# Patient Record
Sex: FEMALE | Race: BLACK OR AFRICAN AMERICAN | Employment: FULL TIME | URBAN - METROPOLITAN AREA
[De-identification: names, ages, dates, MRNs, and addresses within clinical notes are randomized per-mention and may not be internally consistent; named-entity substitution may affect disease eponyms.]

---

## 2020-11-23 ENCOUNTER — OFFICE VISIT (OUTPATIENT)
Dept: INTERNAL MEDICINE CLINIC | Age: 35
End: 2020-11-23
Payer: COMMERCIAL

## 2020-11-23 VITALS
BODY MASS INDEX: 36.5 KG/M2 | SYSTOLIC BLOOD PRESSURE: 126 MMHG | HEIGHT: 63 IN | TEMPERATURE: 97.5 F | HEART RATE: 72 BPM | DIASTOLIC BLOOD PRESSURE: 84 MMHG | OXYGEN SATURATION: 99 % | WEIGHT: 206 LBS

## 2020-11-23 DIAGNOSIS — N92.0 MENORRHAGIA WITH REGULAR CYCLE: ICD-10-CM

## 2020-11-23 DIAGNOSIS — M67.432 GANGLION CYST OF DORSUM OF LEFT WRIST: ICD-10-CM

## 2020-11-23 DIAGNOSIS — Z00.01 ENCOUNTER FOR ROUTINE ADULT HEALTH EXAMINATION WITH ABNORMAL FINDINGS: ICD-10-CM

## 2020-11-23 DIAGNOSIS — F32.1 MODERATE MAJOR DEPRESSION (HCC): Primary | ICD-10-CM

## 2020-11-23 DIAGNOSIS — E66.01 SEVERE OBESITY (HCC): ICD-10-CM

## 2020-11-23 PROCEDURE — 99203 OFFICE O/P NEW LOW 30 MIN: CPT | Performed by: INTERNAL MEDICINE

## 2020-11-23 NOTE — PROGRESS NOTES
Chief Complaint   Patient presents with    New Patient    Menstrual Problem    Depression    Anxiety     Pt states that she needs to establish a new PCP. States that she is having problems with her menstrual cycle. She said that she gets blood clots that are about half dollar signs. She said that three weeks will pass and she will start to have a menstrual cycle with the same problem. Has problems with anxiety and depression. Has had no previous PCP or obgyn.

## 2020-11-23 NOTE — PROGRESS NOTES
Hortensia Burleson is a 37 y.o. female and presents with New Patient; Menstrual Problem; Depression; and Anxiety    She says in 2015 she had a miscarriage since then she started feeeling derpessed, she  a year ago and she moved here from [facility] since then. She says she feels tired all the time, with no desire to do anything, she says she has been having episodes of sudden uncontrolable crying, palpitations, shaking a couple of times a month without a specific trigger, she says this episodes have misty happenning in around 3 years or so, she has not spoken to anyone about how she's feeling, she feels hopeeless and woryhless 3 days out of the week, no suicidal thoughts. She says she has no one to talk to. She sleeps 6 hpurs every day, she works at night, at [employer] she enjoys it. Somedays she has no appetite and does not eat anything, some days she eats well. She says she has lost weight, she was 230 lbs a year ago. She's concerned about heavy menstruation, she says in the past year she has been having longer periods of around 6 days, the first 3 days she has clots, she has to change pads every 3 hours she says they get soaked. She says she has chronic anemia h/o since childhood, but she denies any fh of sickle cell and thalassemia, she says she has not been checked for these either. LMP 11/12/2020. Review of Systems  Review of Systems   Constitutional: Negative for chills, fatigue, fever and unexpected weight change. HENT: Negative for congestion, ear pain, sneezing and sore throat. Eyes: Negative for pain and discharge. Respiratory: Negative for cough, shortness of breath and wheezing. Cardiovascular: Negative for chest pain, palpitations and leg swelling. Gastrointestinal: Negative for abdominal pain, blood in stool, constipation and diarrhea. Endocrine: Negative for polydipsia and polyuria.    Genitourinary: Negative for difficulty urinating, dysuria, frequency, hematuria and urgency. Musculoskeletal: Negative for arthralgias, back pain and joint swelling. Skin: Negative for rash. Allergic/Immunologic: Negative for environmental allergies and food allergies. Neurological: Negative for dizziness, speech difficulty, weakness, light-headedness, numbness and headaches. Hematological: Negative for adenopathy. Psychiatric/Behavioral: Positive for behavioral problems (Depression). Negative for sleep disturbance and suicidal ideas. Past Medical History:   Diagnosis Date    Anemia     Anxiety     Depression      Past Surgical History:   Procedure Laterality Date    HX ORTHOPAEDIC Left 2007    knee     Social History     Socioeconomic History    Marital status: LEGALLY      Spouse name: Not on file    Number of children: Not on file    Years of education: Not on file    Highest education level: Not on file   Tobacco Use    Smoking status: Current Some Day Smoker     Years: 10.00    Smokeless tobacco: Never Used    Tobacco comment: smokes black and milds   Substance and Sexual Activity    Alcohol use: Yes     Comment: occassional    Drug use: Never    Sexual activity: Yes     Partners: Male     Birth control/protection: None     Family History   Problem Relation Age of Onset    Stroke Mother     Stroke Sister     Colon Cancer Maternal Grandmother        No Known Allergies    Objective:  Visit Vitals  /84 (BP 1 Location: Left arm, BP Patient Position: Sitting)   Pulse 72   Temp 97.5 °F (36.4 °C) (Oral)   Ht 5' 3\" (1.6 m)   Wt 206 lb (93.4 kg)   LMP 11/12/2011   SpO2 99% Comment: RA   BMI 36.49 kg/m²     Physical Exam:   Physical Exam  Constitutional:       General: She is not in acute distress. Appearance: Normal appearance. She is obese. HENT:      Head: Normocephalic and atraumatic. Mouth/Throat:      Mouth: Mucous membranes are moist.   Eyes:      Extraocular Movements: Extraocular movements intact.       Conjunctiva/sclera: Conjunctivae normal.      Pupils: Pupils are equal, round, and reactive to light. Neck:      Musculoskeletal: Normal range of motion and neck supple. Cardiovascular:      Rate and Rhythm: Normal rate and regular rhythm. Pulses: Normal pulses. Heart sounds: Normal heart sounds. Pulmonary:      Effort: Pulmonary effort is normal.      Breath sounds: Normal breath sounds. Abdominal:      General: Abdomen is flat. Bowel sounds are normal. There is no distension. Palpations: Abdomen is soft. There is no mass. Tenderness: There is no abdominal tenderness. Musculoskeletal:         General: No swelling or deformity. Right lower leg: No edema. Left lower leg: No edema. Lymphadenopathy:      Cervical: No cervical adenopathy. Skin:     General: Skin is warm and dry. Capillary Refill: Capillary refill takes less than 2 seconds. Coloration: Skin is not jaundiced or pale. Findings: No erythema or rash. Neurological:      General: No focal deficit present. Mental Status: She is alert and oriented to person, place, and time. Psychiatric:         Mood and Affect: Mood normal.         Behavior: Behavior normal.         Thought Content: Thought content normal.         Judgment: Judgment normal.          No results found for this or any previous visit. Assessment/Plan: For depression we discussed about potential treatments, we have decided she will try counseling first, gave her flyer for [place] [id number] for this purpose. Check TFTs, check routine labs. 1200 calorie diet given, recommended brisk walking 30 to 40 mins. goal to lose 5% of her weight in 3 months. For menorrhagia referring her to OB/GYN      ICD-10-CM ICD-9-CM    1. Moderate major depression (HCC)  F32.1 296.22 T4, FREE      TSH 3RD GENERATION   2. Severe obesity ([facility] [address]  E66.01 278.01    3.  Menorrhagia with regular cycle  N92.0 626.2 REFERRAL TO GYNECOLOGY   4. Encounter for routine adult health examination with abnormal findings  Z00.01 V70.0 CBC WITH AUTOMATED DIFF      METABOLIC PANEL, COMPREHENSIVE   5. Ganglion cyst of dorsum of left wrist  M67.432 727.41 REFERRAL TO ORTHOPEDICS     Orders Placed This Encounter    CBC WITH AUTOMATED DIFF    METABOLIC PANEL, COMPREHENSIVE    T4, FREE    TSH 3RD GENERATION    REFERRAL TO GYNECOLOGY     Referral Priority:   Routine     Referral Type:   Consultation     Referral Reason:   Specialty Services Required     Referred to Provider:   Roya Martinez MD     Number of Visits Requested:   1    REFERRAL TO ORTHOPEDICS     Referral Priority:   Routine     Referral Type:   Consultation     Referral Reason:   Specialty Services Required     Referred to Provider:   Roshni Carey MD     Number of Visits Requested:   1       There are no Patient Instructions on file for this visit. Follow-up and Dispositions    · Return in about 2 months (around 1/23/2021).

## 2020-11-26 LAB
ALBUMIN SERPL-MCNC: 3.8 G/DL (ref 3.8–4.8)
ALBUMIN/GLOB SERPL: 1.2 {RATIO} (ref 1.2–2.2)
ALP SERPL-CCNC: 74 IU/L (ref 39–117)
ALT SERPL-CCNC: 13 IU/L (ref 0–32)
AST SERPL-CCNC: 12 IU/L (ref 0–40)
BASOPHILS # BLD AUTO: 0 X10E3/UL (ref 0–0.2)
BASOPHILS NFR BLD AUTO: 0 %
BILIRUB SERPL-MCNC: <0.2 MG/DL (ref 0–1.2)
BUN SERPL-MCNC: 12 MG/DL (ref 6–20)
BUN/CREAT SERPL: 17 (ref 9–23)
CALCIUM SERPL-MCNC: 9.1 MG/DL (ref 8.7–10.2)
CHLORIDE SERPL-SCNC: 104 MMOL/L (ref 96–106)
CO2 SERPL-SCNC: 21 MMOL/L (ref 20–29)
CREAT SERPL-MCNC: 0.7 MG/DL (ref 0.57–1)
EOSINOPHIL # BLD AUTO: 0.2 X10E3/UL (ref 0–0.4)
EOSINOPHIL NFR BLD AUTO: 4 %
ERYTHROCYTE [DISTWIDTH] IN BLOOD BY AUTOMATED COUNT: 13.6 % (ref 11.7–15.4)
GLOBULIN SER CALC-MCNC: 3.2 G/DL (ref 1.5–4.5)
GLUCOSE SERPL-MCNC: 84 MG/DL (ref 65–99)
HCT VFR BLD AUTO: 35.3 % (ref 34–46.6)
HGB BLD-MCNC: 11.3 G/DL (ref 11.1–15.9)
IMM GRANULOCYTES # BLD AUTO: 0 X10E3/UL (ref 0–0.1)
IMM GRANULOCYTES NFR BLD AUTO: 0 %
LYMPHOCYTES # BLD AUTO: 4 X10E3/UL (ref 0.7–3.1)
LYMPHOCYTES NFR BLD AUTO: 68 %
MCH RBC QN AUTO: 26.8 PG (ref 26.6–33)
MCHC RBC AUTO-ENTMCNC: 32 G/DL (ref 31.5–35.7)
MCV RBC AUTO: 84 FL (ref 79–97)
MONOCYTES # BLD AUTO: 0.2 X10E3/UL (ref 0.1–0.9)
MONOCYTES NFR BLD AUTO: 4 %
NEUTROPHILS # BLD AUTO: 1.4 X10E3/UL (ref 1.4–7)
NEUTROPHILS NFR BLD AUTO: 24 %
PLATELET # BLD AUTO: 296 X10E3/UL (ref 150–450)
POTASSIUM SERPL-SCNC: 3.9 MMOL/L (ref 3.5–5.2)
PROT SERPL-MCNC: 7 G/DL (ref 6–8.5)
RBC # BLD AUTO: 4.21 X10E6/UL (ref 3.77–5.28)
SODIUM SERPL-SCNC: 139 MMOL/L (ref 134–144)
T4 FREE SERPL-MCNC: 1.12 NG/DL (ref 0.82–1.77)
TSH SERPL DL<=0.005 MIU/L-ACNC: 2.5 UIU/ML (ref 0.45–4.5)
WBC # BLD AUTO: 5.9 X10E3/UL (ref 3.4–10.8)

## 2021-10-28 ENCOUNTER — HOSPITAL ENCOUNTER (EMERGENCY)
Age: 36
Discharge: HOME | End: 2021-10-28
Attending: EMERGENCY MEDICINE
Payer: COMMERCIAL

## 2021-10-28 ENCOUNTER — APPOINTMENT (OUTPATIENT)
Dept: CT IMAGING | Age: 36
End: 2021-10-28
Attending: EMERGENCY MEDICINE
Payer: COMMERCIAL

## 2021-10-28 VITALS
SYSTOLIC BLOOD PRESSURE: 141 MMHG | RESPIRATION RATE: 16 BRPM | DIASTOLIC BLOOD PRESSURE: 87 MMHG | HEART RATE: 80 BPM | OXYGEN SATURATION: 95 % | TEMPERATURE: 99 F | BODY MASS INDEX: 37.17 KG/M2 | WEIGHT: 202 LBS | HEIGHT: 62 IN

## 2021-10-28 DIAGNOSIS — N20.0 KIDNEY STONE: Primary | ICD-10-CM

## 2021-10-28 LAB
APPEARANCE UR: ABNORMAL
BILIRUB UR QL: NEGATIVE
COLOR UR: YELLOW
GLUCOSE UR STRIP.AUTO-MCNC: NEGATIVE MG/DL
HCG UR QL: NEGATIVE
HGB UR QL STRIP: NEGATIVE
KETONES UR QL STRIP.AUTO: NEGATIVE MG/DL
LEUKOCYTE ESTERASE UR QL STRIP.AUTO: NEGATIVE
NITRITE UR QL STRIP.AUTO: NEGATIVE
PH UR STRIP: 5 [PH] (ref 5–8)
PROT UR STRIP-MCNC: NEGATIVE MG/DL
SP GR UR REFRACTOMETRY: 1.02 (ref 1–1.03)
UROBILINOGEN UR QL STRIP.AUTO: 0.1 EU/DL (ref 0.2–1)

## 2021-10-28 PROCEDURE — 81025 URINE PREGNANCY TEST: CPT

## 2021-10-28 PROCEDURE — 74176 CT ABD & PELVIS W/O CONTRAST: CPT

## 2021-10-28 PROCEDURE — 99283 EMERGENCY DEPT VISIT LOW MDM: CPT

## 2021-10-28 PROCEDURE — 74011250636 HC RX REV CODE- 250/636: Performed by: EMERGENCY MEDICINE

## 2021-10-28 PROCEDURE — 96372 THER/PROPH/DIAG INJ SC/IM: CPT

## 2021-10-28 PROCEDURE — 81003 URINALYSIS AUTO W/O SCOPE: CPT

## 2021-10-28 PROCEDURE — 74011250637 HC RX REV CODE- 250/637: Performed by: EMERGENCY MEDICINE

## 2021-10-28 RX ORDER — ACETAMINOPHEN 500 MG
1000 TABLET ORAL ONCE
Status: COMPLETED | OUTPATIENT
Start: 2021-10-28 | End: 2021-10-28

## 2021-10-28 RX ORDER — ONDANSETRON 4 MG/1
4 TABLET, ORALLY DISINTEGRATING ORAL
Qty: 12 TABLET | Refills: 0 | Status: SHIPPED | OUTPATIENT
Start: 2021-10-28

## 2021-10-28 RX ORDER — IBUPROFEN 800 MG/1
800 TABLET ORAL
Qty: 20 TABLET | Refills: 0 | Status: SHIPPED | OUTPATIENT
Start: 2021-10-28 | End: 2021-11-04

## 2021-10-28 RX ORDER — TAMSULOSIN HYDROCHLORIDE 0.4 MG/1
0.4 CAPSULE ORAL DAILY
Qty: 3 CAPSULE | Refills: 0 | Status: SHIPPED | OUTPATIENT
Start: 2021-10-28 | End: 2021-10-31

## 2021-10-28 RX ORDER — HYDROCODONE BITARTRATE AND ACETAMINOPHEN 5; 325 MG/1; MG/1
1 TABLET ORAL
Qty: 12 TABLET | Refills: 0 | Status: SHIPPED | OUTPATIENT
Start: 2021-10-28 | End: 2021-10-31

## 2021-10-28 RX ORDER — KETOROLAC TROMETHAMINE 30 MG/ML
60 INJECTION, SOLUTION INTRAMUSCULAR; INTRAVENOUS
Status: COMPLETED | OUTPATIENT
Start: 2021-10-28 | End: 2021-10-28

## 2021-10-28 RX ORDER — TAMSULOSIN HYDROCHLORIDE 0.4 MG/1
0.4 CAPSULE ORAL ONCE
Status: COMPLETED | OUTPATIENT
Start: 2021-10-28 | End: 2021-10-28

## 2021-10-28 RX ADMIN — TAMSULOSIN HYDROCHLORIDE 0.4 MG: 0.4 CAPSULE ORAL at 21:32

## 2021-10-28 RX ADMIN — KETOROLAC TROMETHAMINE 60 MG: 30 INJECTION, SOLUTION INTRAMUSCULAR; INTRAVENOUS at 19:40

## 2021-10-28 RX ADMIN — ACETAMINOPHEN 1000 MG: 500 TABLET ORAL at 19:40

## 2021-10-28 NOTE — ED TRIAGE NOTES
Started 10-, At work sharp right lateral lower rib pain, then went to vagina, , tested at Dr. No bladder issue given Nitrofurnatoin Mono and Toradol. Didn't help. Today became sharp just to right lower lateral rib cage, non tender, turn to right makes worse, except sharp pain on deep breath, Now has urinary frequency.

## 2021-10-28 NOTE — LETTER
Westley Khan was seen and treated in our emergency department on 10/28/2021. Please excuse from work 10/28/2021 through 10/30/2021. If you have any questions or concerns, please don't hesitate to call.       Attending Provider: Carlee Angel MD

## 2021-10-29 NOTE — ED PROVIDER NOTES
EMERGENCY DEPARTMENT HISTORY AND PHYSICAL EXAM      Date: 10/28/2021  Patient Name: Lizy Avina    History of Presenting Illness     Chief Complaint   Patient presents with    Rib Pain       History Provided By: Patient    HPI: Lizy Avina, 37 y.o. female   presents to the ED with cc of right flank pain. Patient complains of right flank pain for last 2 weeks. Pain has been constant with fluctuating intensity from mild to moderate degree without obvious aggravating alleviating factors. Patient was seen at local emergency room for possible UTI without much relief. No dysuria hematuria. No nausea vomiting or diarrhea. No signs of GI bleeding. No injury. Patient states that pain intermittently radiates to right lower quadrant. No paresthesia or motor dysfunction. No fever chills. PCP: More Willoughby MD    No current facility-administered medications on file prior to encounter. No current outpatient medications on file prior to encounter. Past History     Past Medical History:  Past Medical History:   Diagnosis Date    Anemia     Anxiety     Depression        Past Surgical History:  Past Surgical History:   Procedure Laterality Date    HX ORTHOPAEDIC Left 2007    knee       Family History:  Family History   Problem Relation Age of Onset    Stroke Mother     Stroke Sister     Colon Cancer Maternal Grandmother        Social History:  Social History     Tobacco Use    Smoking status: Former Smoker     Years: 10.00    Smokeless tobacco: Never Used    Tobacco comment: smokes black and milds   Vaping Use    Vaping Use: Some days    Substances: Flavoring    Devices: Disposable   Substance Use Topics    Alcohol use: Yes     Comment: occassional    Drug use: Never       Allergies:  No Known Allergies      Review of Systems   Review of Systems   Constitutional: Negative for activity change, appetite change, chills and fever. HENT: Negative for sore throat. Eyes: Negative for discharge. Respiratory: Negative for shortness of breath. Cardiovascular: Negative for chest pain. Gastrointestinal: Negative for nausea. Endocrine: Negative for polyuria. Genitourinary: Negative for difficulty urinating. Musculoskeletal: Negative for arthralgias. Skin: Negative for rash. Neurological: Negative for headaches. Hematological: Negative for adenopathy. Psychiatric/Behavioral: Negative for suicidal ideas. All other systems reviewed and are negative. Physical Exam   Physical Exam  Vitals and nursing note reviewed. Constitutional:       Appearance: Normal appearance. HENT:      Head: Normocephalic and atraumatic. Nose: Nose normal.      Mouth/Throat:      Mouth: Mucous membranes are moist.      Pharynx: Oropharynx is clear. Eyes:      Conjunctiva/sclera: Conjunctivae normal.   Cardiovascular:      Rate and Rhythm: Normal rate and regular rhythm. Heart sounds: Normal heart sounds. Pulmonary:      Effort: Pulmonary effort is normal.      Breath sounds: Normal breath sounds. Abdominal:      General: Abdomen is flat. Bowel sounds are normal.      Palpations: Abdomen is soft. Tenderness: There is no abdominal tenderness. There is no right CVA tenderness, left CVA tenderness, guarding or rebound. Musculoskeletal:      Cervical back: Neck supple. Right lower leg: No edema. Left lower leg: No edema. Skin:     General: Skin is warm and dry. Neurological:      General: No focal deficit present. Mental Status: She is alert and oriented to person, place, and time.    Psychiatric:         Mood and Affect: Mood normal.         Diagnostic Study Results     Labs -     Recent Results (from the past 12 hour(s))   URINALYSIS W/ RFLX MICROSCOPIC    Collection Time: 10/28/21  7:15 PM   Result Value Ref Range    Color Yellow      Appearance Cloudy (A) Clear      Specific gravity 1.020 1.003 - 1.030      pH (UA) 5.0 5.0 - 8.0 Protein Negative Negative mg/dL    Glucose Negative Negative mg/dL    Ketone Negative Negative mg/dL    Bilirubin Negative Negative      Blood Negative Negative      Urobilinogen 0.1 (L) 0.2 - 1.0 EU/dL    Nitrites Negative Negative      Leukocyte Esterase Negative Negative     HCG URINE, QL    Collection Time: 10/28/21  7:15 PM   Result Value Ref Range    HCG urine, QL Negative Negative         Radiologic Studies -   CT ABD PELV WO CONT   Final Result   2 mm distal right ureteral calculus producing no definite   obstruction. Dose reduction: All CT scans at this facility are performed using radiation dose   reduction optimization techniques as appropriate to a performed exam, including   the following: Automated exposure control (AEC), adjustment of the MAA and/or   KUB according to the patient's size, or the patient's use of iterative   reconstruction techniques (ASIR). CT Results  (Last 48 hours)               10/28/21 2000  CT ABD PELV WO CONT Final result    Impression:  2 mm distal right ureteral calculus producing no definite   obstruction. Dose reduction: All CT scans at this facility are performed using radiation dose   reduction optimization techniques as appropriate to a performed exam, including   the following: Automated exposure control (AEC), adjustment of the MAA and/or   KUB according to the patient's size, or the patient's use of iterative   reconstruction techniques (ASIR). Narrative:  CT scan the abdomen and pelvis is performed without IV or oral contrast per   renal colic protocol. Coronal reconstructions are generated. Comparison is made   with a similar exam from 12/28/2013. Findings:Lung bases are normal. The solid abdominal organs are normal within the   limitations of a non-IV contrast exam. Neither kidney reveals any calculi or   hydronephrosis. Both ureters are normal in caliber with no definite   ureterolithiasis.  A 2 mm calcification seen in the right posterior pelvis. This   corresponds with a tiny distal ureteral calculus medial to the right obturator   internus muscle. This corresponds with the opacified ureter on the delayed   images from the 12/20/2013 CT scan       No free intraperitoneal fluid or gas is identified. The bowel is unopacified but   normal in caliber. The appendix is not identified. The uterus is unremarkable. Bone windows are unremarkable. CXR Results  (Last 48 hours)    None            Medical Decision Making   I am the first provider for this patient. I reviewed the vital signs, available nursing notes, past medical history, past surgical history, family history and social history. Vital Signs-Reviewed the patient's vital signs. Patient Vitals for the past 12 hrs:   Temp Pulse Resp BP SpO2   10/28/21 1640 99 °F (37.2 °C) 80 16 (!) 141/87 95 %       Records Reviewed:     Provider Notes (Medical Decision Making):       ED Course:   Initial assessment performed. The patients presenting problems have been discussed, and they are in agreement with the care plan formulated and outlined with them. I have encouraged them to ask questions as they arise throughout their visit. PROCEDURES      Disposition: Condition stable   DC- Adult Discharges: All of the diagnostic tests were reviewed and questions answered. Diagnosis, care plan and treatment options were discussed. understand instructions and will follow up as directed. The patients results have been reviewed with them. They have been counseled regarding their diagnosis. The patient verbally convey understanding and agreement of the signs, symptoms, diagnosis, treatment and prognosis and additionally agrees to follow up as recommended. They also agree with the care-plan and convey that all of their questions have been answered.   I have also put together some discharge instructions for them that include: 1) educational information regarding their diagnosis, 2) how to care for their diagnosis at home, as well a 3) list of reasons why they would want to return to the ED prior to their follow-up appointment, should their condition change. PLAN:  1. Discharge Medication List as of 10/28/2021  9:16 PM      START taking these medications    Details   ibuprofen (MOTRIN) 800 mg tablet Take 1 Tablet by mouth every eight (8) hours as needed for Pain for up to 7 days. , Normal, Disp-20 Tablet, R-0      HYDROcodone-acetaminophen (Norco) 5-325 mg per tablet Take 1 Tablet by mouth every six (6) hours as needed for Pain for up to 3 days. Max Daily Amount: 4 Tablets., Normal, Disp-12 Tablet, R-0      ondansetron (Zofran ODT) 4 mg disintegrating tablet Take 1 Tablet by mouth every eight (8) hours as needed for Nausea, Vomiting or Nausea or Vomiting., Normal, Disp-12 Tablet, R-0      tamsulosin (Flomax) 0.4 mg capsule Take 1 Capsule by mouth daily for 3 days. , Normal, Disp-3 Capsule, R-0           2. Follow-up Information     Follow up With Specialties Details Why Contact Info    Angelia Gee MD Urology   [address] [place] [id number]  [phone]          Return to ED if worse     Diagnosis     Clinical Impression:   1. Kidney stone        Please note that this dictation was completed with [employer], the computer voice recognition software. Quite often unanticipated grammatical, syntax, homophones, and other interpretive errors are inadvertently transcribed by the computer software. Please disregard these errors. Please excuse any errors that have escaped final proofreading. Thank you.

## 2022-01-07 ENCOUNTER — HOSPITAL ENCOUNTER (EMERGENCY)
Age: 37
Discharge: HOME | End: 2022-01-07
Attending: EMERGENCY MEDICINE
Payer: COMMERCIAL

## 2022-01-07 VITALS
BODY MASS INDEX: 34.96 KG/M2 | SYSTOLIC BLOOD PRESSURE: 146 MMHG | HEART RATE: 86 BPM | WEIGHT: 190 LBS | TEMPERATURE: 98.2 F | RESPIRATION RATE: 16 BRPM | DIASTOLIC BLOOD PRESSURE: 94 MMHG | OXYGEN SATURATION: 99 % | HEIGHT: 62 IN

## 2022-01-07 DIAGNOSIS — U07.1 COVID: Primary | ICD-10-CM

## 2022-01-07 PROCEDURE — 99281 EMR DPT VST MAYX REQ PHY/QHP: CPT

## 2022-01-07 RX ORDER — NAPROXEN 500 MG/1
500 TABLET ORAL
Qty: 20 TABLET | Refills: 0 | Status: SHIPPED | OUTPATIENT
Start: 2022-01-07

## 2022-01-07 NOTE — ED PROVIDER NOTES
EMERGENCY DEPARTMENT HISTORY AND PHYSICAL EXAM      Date: (Not on file)  Patient Name: Sherrie Sharma    History of Presenting Illness     Chief Complaint   Patient presents with    Positive For Covid-19       History Provided By: Patient    HPI: Sherrie Sharma, 31 y.o. female presents to the ED with CC of fever, chills, cough and runny nose and headaches. She denies any chest pain or shortness of breath. She is been diagnosed with Covid and needs a note for her employer. She has had a positive rapid test..       Patient denies SOB, chest pain, or any neurological symptoms. There are no other complaints, changes, or physical findings at this time. Past History     Past Medical History:  Past Medical History:   Diagnosis Date    Anemia     Anxiety     Depression        Allergies:  No Known Allergies    Review of Systems   Vital signs and nursing notes reviewed  Review of Systems   Constitutional: Positive for fatigue and fever. Negative for appetite change and chills. HENT: Positive for rhinorrhea. Negative for congestion and sinus pain. Eyes: Negative. Negative for pain and visual disturbance. Respiratory: Negative. Negative for chest tightness and shortness of breath. Cardiovascular: Negative. Negative for chest pain. Gastrointestinal: Negative. Negative for abdominal pain, diarrhea, nausea and vomiting. Genitourinary: Negative. Negative for difficulty urinating. No discharge   Musculoskeletal: Positive for myalgias. Negative for arthralgias. Skin: Negative. Negative for rash. Neurological: Negative. Negative for weakness and headaches. Hematological: Negative. Psychiatric/Behavioral: Negative. Negative for agitation. The patient is not nervous/anxious. All other systems reviewed and are negative.         Physical Exam     Visit Vitals  BP (!) 146/94 (BP 1 Location: Right arm, BP Patient Position: At rest;Sitting)   Pulse 86   Temp 98.2 °F (36.8 °C)   Resp 16   Ht 5' 2\" (1.575 m)   Wt 86.2 kg (190 lb)   SpO2 99%   BMI 34.75 kg/m²     CONSTITUTIONAL: Alert, in no distress. Appears stated age. HEAD:  Normocephalic, atraumatic  EYES: EOM intact. No conjunctival injection or scleral icterus  Neck:  Supple. No meningismus  RESP: Normal with no work of breathing, speaking in full sentences. CV: Well perfused. NEURO: Alert with normal mentation, moving extremities spontaneously  PSYCH: Normal mood, normal affect  *    Medical Decision Making   Patient presents for COVID 19 testing with normal oxygen saturation and mild URI symptoms or COVID 19 exposure. COVID 19 testing was not conducted. The patient was given quarantine/isolation recommendations and agrees with the plan to be discharged home. They were provided instructions to return for difficulty breathing, chest pain, altered mentation, or any other new or worsening symptoms. ED Course:   Initial assessment performed. The patients presenting problems have been discussed, and they are in agreement with the care plan formulated and outlined with them. I have encouraged them to ask questions as they arise throughout their visit. Critical Care Time: None    Disposition:  DISCHARGE NOTE:  The pt is ready for discharge. The pt's signs, symptoms, diagnosis, and discharge instructions have been discussed and pt has conveyed their understanding. The pt is to follow up as recommended or return to ER should their symptoms worsen. Plan has been discussed and pt is in agreement. PLAN:  1. Current Discharge Medication List      START taking these medications    Details   naproxen (NAPROSYN) 500 mg tablet Take 1 Tablet by mouth every twelve (12) hours as needed for Pain. Qty: 20 Tablet, Refills: 0  Start date: 1/7/2022           2.    Follow-up Information     Follow up With Specialties Details Why Contact Info    Griselda Motta MD Obstetrics & Gynecology Call in 1 week  [address] [place]  [phone]          3. COVID Testing results will be called once available if positive. Patient should utilize [employer] to access results. 4. Take Tylenol or Ibuprofen as needed  5. Drink plenty of fluids  6. Return to ED if worse especially if any shortness of breath, chest pain or altered mentation. Diagnosis     Clinical Impression:   1. COVID            Please note that this dictation was completed with [employer], the computer voice recognition software. Quite often unanticipated grammatical, syntax, homophones, and other interpretive errors are inadvertently transcribed by the computer software. Please disregards these errors. Please excuse any errors that have escaped final proofreading.

## 2022-01-07 NOTE — Clinical Note
[facility] EMERGENCY DEPT  [address] [facility] [place] [id number]  [phone]    Work/School Note    Date: 1/7/2022     To Whom It May concern:    Miguel Pedroza was evaluated by the following provider(s):  Attending Provider: Sae Rolle virus is suspected. Per the CDC guidelines we recommend home isolation until the following conditions are all met:    1. At least five days have passed since symptoms first appeared and/or had a close exposure,   2. After home isolation for five days, wearing a mask around others for the next five days,  3. At least 24 have passed since last fever without the use of fever-reducing medications and  4.  Symptoms (eg cough, shortness of breath) have improved      Sincerely,          Josephine Turner MD

## 2022-03-19 PROBLEM — E66.01 SEVERE OBESITY (HCC): Status: ACTIVE | Noted: 2020-11-23

## 2023-01-28 ENCOUNTER — APPOINTMENT (OUTPATIENT)
Dept: ULTRASOUND IMAGING | Age: 38
End: 2023-01-28
Attending: EMERGENCY MEDICINE
Payer: COMMERCIAL

## 2023-01-28 ENCOUNTER — HOSPITAL ENCOUNTER (EMERGENCY)
Age: 38
Discharge: HOME | End: 2023-01-28
Attending: EMERGENCY MEDICINE
Payer: COMMERCIAL

## 2023-01-28 VITALS
DIASTOLIC BLOOD PRESSURE: 75 MMHG | TEMPERATURE: 98.7 F | HEIGHT: 62 IN | WEIGHT: 199 LBS | RESPIRATION RATE: 18 BRPM | SYSTOLIC BLOOD PRESSURE: 118 MMHG | OXYGEN SATURATION: 100 % | BODY MASS INDEX: 36.62 KG/M2 | HEART RATE: 81 BPM

## 2023-01-28 DIAGNOSIS — O46.90 VAGINAL BLEEDING DURING PREGNANCY: ICD-10-CM

## 2023-01-28 DIAGNOSIS — D21.9 FIBROIDS: Primary | ICD-10-CM

## 2023-01-28 LAB
ABO + RH BLD: NORMAL
APPEARANCE UR: CLEAR
BACTERIA URNS QL MICRO: ABNORMAL /HPF
BILIRUB UR QL: NEGATIVE
COLOR UR: YELLOW
EPITH CASTS URNS QL MICRO: ABNORMAL /LPF
GLUCOSE UR STRIP.AUTO-MCNC: NEGATIVE MG/DL
HCG SERPL-ACNC: 422.1 MIU/ML (ref 0–6)
HGB UR QL STRIP: ABNORMAL
KETONES UR QL STRIP.AUTO: NEGATIVE MG/DL
LEUKOCYTE ESTERASE UR QL STRIP.AUTO: ABNORMAL
NITRITE UR QL STRIP.AUTO: NEGATIVE
PH UR STRIP: 5 (ref 5–8)
PROT UR STRIP-MCNC: 30 MG/DL
RBC #/AREA URNS HPF: ABNORMAL /HPF (ref 0–5)
SP GR UR REFRACTOMETRY: 1.03 (ref 1–1.03)
UA: UC IF INDICATED,UAUC: ABNORMAL
UROBILINOGEN UR QL STRIP.AUTO: 0.1 EU/DL (ref 0.2–1)
WBC URNS QL MICRO: ABNORMAL /HPF (ref 0–4)

## 2023-01-28 PROCEDURE — 99284 EMERGENCY DEPT VISIT MOD MDM: CPT

## 2023-01-28 PROCEDURE — 76801 OB US < 14 WKS SINGLE FETUS: CPT

## 2023-01-28 PROCEDURE — 84702 CHORIONIC GONADOTROPIN TEST: CPT

## 2023-01-28 PROCEDURE — 76817 TRANSVAGINAL US OBSTETRIC: CPT

## 2023-01-28 PROCEDURE — 86900 BLOOD TYPING SEROLOGIC ABO: CPT

## 2023-01-28 PROCEDURE — 81001 URINALYSIS AUTO W/SCOPE: CPT

## 2023-01-28 NOTE — ED TRIAGE NOTES
31 YO presents with positive pregnancy test. States she has been menstruating since 1/17, with right sided ab pain.  No dysuria, nausea vomiting

## 2023-01-28 NOTE — DISCHARGE INSTRUCTIONS
Thank you! Thank you for allowing me to care for you in the emergency department. It is my goal to provide you with excellent care. If you have not received excellent quality care, please ask to speak to the nurse manager. Please fill out the survey that will come to you by mail or email since we listen to your feedback! Below you will find a list of your tests from today's visit. Should you have any questions, please do not hesitate to call the emergency department. Labs  Recent Results (from the past 12 hour(s))   URINALYSIS W/ REFLEX CULTURE    Collection Time: 01/28/23  4:30 PM    Specimen: Urine   Result Value Ref Range    Color Yellow      Appearance Clear Clear      Specific gravity 1.030 1.003 - 1.030      pH (UA) 5.0 5.0 - 8.0      Protein 30 (A) Negative mg/dL    Glucose Negative Negative mg/dL    Ketone Negative Negative mg/dL    Bilirubin Negative Negative      Blood Large (A) Negative      Urobilinogen 0.1 (L) 0.2 - 1.0 EU/dL    Nitrites Negative Negative      Leukocyte Esterase Trace (A) Negative      WBC 5-10 0 - 4 /hpf    RBC  0 - 5 /hpf    Epithelial cells Moderate (A) Few /lpf    Bacteria 2+ (A) Negative /hpf    UA:UC IF INDICATED Culture not indicated by UA result Culture not indicated by UA result     BETA HCG, QT    Collection Time: 01/28/23  4:30 PM   Result Value Ref Range    Beta HCG, .1 (H) 0 - 6 mIU/mL       Radiologic Studies  US UTS TRANSVAGINAL OB    (Results Pending)     CT Results  (Last 48 hours)      None          CXR Results  (Last 48 hours)      None          ------------------------------------------------------------------------------------------------------------  The exam and treatment you received in the Emergency Department were for an urgent problem and are not intended as complete care.  It is important that you follow-up with a doctor, nurse practitioner, or physician assistant to:  (1) confirm your diagnosis,  (2) re-evaluation of changes in your illness and treatment, and  (3) for ongoing care. Please take your discharge instructions with you when you go to your follow-up appointment. If you have any problem arranging a follow-up appointment, contact the Emergency Department. If your symptoms become worse or you do not improve as expected and you are unable to reach your health care provider, please return to the Emergency Department. We are available 24 hours a day. If a prescription has been provided, please have it filled as soon as possible to prevent a delay in treatment. If you have any questions or reservations about taking the medication due to side effects or interactions with other medications, please call your primary care provider or contact the ER.

## 2023-01-28 NOTE — ED PROVIDER NOTES
[facility] EMERGENCY DEPARTMENT  EMERGENCY DEPARTMENT HISTORY AND PHYSICAL EXAM      Date: 2023  Patient Name: Mag Lion  MRN: [id number]  [place]: 1985  Date of evaluation: 2023  Provider: Madelaine Sandra MD   Note Started: 4:16 PM 23    HISTORY OF PRESENT ILLNESS     Chief Complaint   Patient presents with    Pregnancy Problem     vag    Vaginal Bleeding       History Provided By: Patient    HPI: Mag Lion, 30 y.o. female  (miscarriage 7 yrs ago) presents with vaginal bleeding since  which was during her regular menses but lasted much longer than usual. Pt states for past few months she has had irregular cycles, lasting longer than usual. She had a diff feeling yesterday in her lower abd and took pregnancy test two days ago which was positive. Her bleeding has slowed down to just spotting with wiping now. She states last day she bleed more like regular period was two days ago. She denies abd pain, dysuria, NV, breast tenderness.      PAST MEDICAL HISTORY   Past Medical History:  Past Medical History:   Diagnosis Date    Anemia     Anxiety     Depression     Fallopian tube disorder     Fibroid     Miscarriage        Past Surgical History:  Past Surgical History:   Procedure Laterality Date    HX ORTHOPAEDIC Left 2007    knee       Family History:  Family History   Problem Relation Age of Onset    Stroke Mother     Stroke Sister     Colon Cancer Maternal Grandmother        Social History:  Social History     Tobacco Use    Smoking status: Former     Years: 10.00     Types: Cigarettes    Smokeless tobacco: Never    Tobacco comments:     smokes black and milds   Vaping Use    Vaping Use: Former    Substances: Flavoring    Devices: Disposable   Substance Use Topics    Alcohol use: Not Currently     Comment: occasional    Drug use: Never       Allergies:  No Known Allergies    PCP: Anju Horton MD    Current Meds:   Previous Medications    NAPROXEN (NAPROSYN) 500 MG TABLET    Take 1 Tablet by mouth every twelve (12) hours as needed for Pain. ONDANSETRON (ZOFRAN ODT) 4 MG DISINTEGRATING TABLET    Take 1 Tablet by mouth every eight (8) hours as needed for Nausea, Vomiting or Nausea or Vomiting. REVIEW OF SYSTEMS   Review of Systems   Constitutional: Negative. Negative for activity change, appetite change, fatigue and fever. HENT: Negative. Negative for congestion, rhinorrhea and sore throat. Respiratory: Negative. Negative for cough, shortness of breath and wheezing. Cardiovascular: Negative. Negative for chest pain and leg swelling. Gastrointestinal: Negative. Negative for abdominal distention, abdominal pain, constipation, diarrhea, nausea and vomiting. Endocrine: Negative. Genitourinary:  Positive for menstrual problem and vaginal bleeding. Negative for difficulty urinating, dysuria, flank pain, urgency and vaginal discharge. Musculoskeletal: Negative. Negative for arthralgias, back pain, joint swelling and myalgias. Skin: Negative. Negative for rash. Neurological: Negative. Negative for dizziness, weakness, light-headedness and headaches. Psychiatric/Behavioral: Negative. Positives and Pertinent negatives as per HPI. PHYSICAL EXAM     ED Triage Vitals [01/28/23 1559]   ED Encounter Vitals Group      /69      Pulse (Heart Rate) 77      Resp Rate 18      Temp 98.7 °F (37.1 °C)      Temp src       O2 Sat (%) 100 %      Weight 199 lb      Height 5' 2\"      Physical Exam  Vitals and nursing note reviewed. Constitutional:       General: She is not in acute distress. Appearance: Normal appearance. She is not ill-appearing or toxic-appearing. HENT:      Head: Normocephalic. Mouth/Throat:      Mouth: Mucous membranes are moist.   Eyes:      Extraocular Movements: Extraocular movements intact. Conjunctiva/sclera: Conjunctivae normal.      Pupils: Pupils are equal, round, and reactive to light.    Cardiovascular: Rate and Rhythm: Normal rate and regular rhythm. Pulses: Normal pulses. Pulmonary:      Effort: Pulmonary effort is normal.      Breath sounds: Normal breath sounds. Abdominal:      General: Abdomen is flat. Bowel sounds are normal.      Palpations: Abdomen is soft. Tenderness: There is no abdominal tenderness. There is no guarding. Musculoskeletal:         General: No swelling. Normal range of motion. Cervical back: Normal range of motion. Skin:     Findings: No erythema or rash. Neurological:      General: No focal deficit present. Mental Status: She is alert and oriented to person, place, and time. Cranial Nerves: No cranial nerve deficit.    Psychiatric:         Mood and Affect: Mood normal.       SCREENINGS               No data recorded        LAB, EKG AND DIAGNOSTIC RESULTS   Labs:  Recent Results (from the past 12 hour(s))   URINALYSIS W/ REFLEX CULTURE    Collection Time: 01/28/23  4:30 PM    Specimen: Urine   Result Value Ref Range    Color Yellow      Appearance Clear Clear      Specific gravity 1.030 1.003 - 1.030      pH (UA) 5.0 5.0 - 8.0      Protein 30 (A) Negative mg/dL    Glucose Negative Negative mg/dL    Ketone Negative Negative mg/dL    Bilirubin Negative Negative      Blood Large (A) Negative      Urobilinogen 0.1 (L) 0.2 - 1.0 EU/dL    Nitrites Negative Negative      Leukocyte Esterase Trace (A) Negative      WBC 5-10 0 - 4 /hpf    RBC  0 - 5 /hpf    Epithelial cells Moderate (A) Few /lpf    Bacteria 2+ (A) Negative /hpf    UA:UC IF INDICATED Culture not indicated by UA result Culture not indicated by UA result     BETA HCG, QT    Collection Time: 01/28/23  4:30 PM   Result Value Ref Range    Beta HCG, .1 (H) 0 - 6 mIU/mL           Radiologic Studies:  Non-plain film images such as CT, Ultrasound and MRI are read by the radiologist. Plain radiographic images are visualized and preliminarily interpreted by the ED Provider with the below findings:        Interpretation per the Radiologist below, if available at the time of this note:  US UTS TRANSVAGINAL OB    Result Date: 1/28/2023  LIMITED TRANSABDOMINAL AND TRANSVAGINAL PELVIC ULTRASOUND WITH PELVIC DOPPLER. 1/28/2023 5:50 PM INDICATION: Vaginal bleeding, pregnant. COMPARISON: None. TECHNIQUE: Limited grayscale, color, and Doppler ultrasound imaging of the pelvis was performed both transabdominally and transvaginally. FINDINGS: No intrauterine pregnancy shown. The endometrium is obscured by fundal fibroids. A pedunculated fibroid in the left adnexa measures 40 mm. The ovaries are not shown. Intramuscular fundal fibroid. Pedunculated left fibroid. Fibroids obscure endometrium; no intrauterine pregnancy shown. US PREG UTS < 14 WKS SNGL    Result Date: 1/28/2023  LIMITED TRANSABDOMINAL AND TRANSVAGINAL PELVIC ULTRASOUND WITH PELVIC DOPPLER. 1/28/2023 5:50 PM INDICATION: Vaginal bleeding, pregnant. COMPARISON: None. TECHNIQUE: Limited grayscale, color, and Doppler ultrasound imaging of the pelvis was performed both transabdominally and transvaginally. FINDINGS: No intrauterine pregnancy shown. The endometrium is obscured by fundal fibroids. A pedunculated fibroid in the left adnexa measures 40 mm. The ovaries are not shown. Intramuscular fundal fibroid. Pedunculated left fibroid. Fibroids obscure endometrium; no intrauterine pregnancy shown. PROCEDURES   Unless otherwise noted below, none.   Performed by: Nitza Love MD   Procedures      CRITICAL CARE TIME       ED COURSE and DIFFERENTIAL DIAGNOSIS/MDM   Vitals:    Vitals:    01/28/23 1559   BP: 110/69   Pulse: 77   Resp: 18   Temp: 98.7 °F (37.1 °C)   SpO2: 100%   Weight: 90.3 kg (199 lb)   Height: 5' 2\" (1.575 m)        Patient was given the following medications:  Medications - No data to display    CONSULTS: (Who and What was discussed)  None     Chronic Conditions:   Social Determinants affecting Dx or Tx: None  Counseling: Records Reviewed (source and summary of external notes): Nursing notes    CC/HPI Summary, DDx, ED Course, and Reassessment: Having dysfunctional uterine bleeding, possible fibroids, pregnancy, bleeding possible miscarriage. We will check beta quant and order transvaginal ultrasound. Is difficult to assess timing of pregnancy. Patient has had prior miscarriage 7 years before. She denies any passage of tissue. She denies any dizziness lightheadedness or shortness of breath to suggest acute blood loss anemia. Progress note: Pt denies any vaginal bleeding during ED visit and has no pain at time of discharge. Disposition Considerations (Tests not done, Shared Decision Making, Pt Expectation of Test or Treatment.): Pt advised to obtain repeat beta HCG in 48 hours to determine if pregnancy evolving as expected. FINAL IMPRESSION     1. Fibroids    2. Vaginal bleeding during pregnancy          DISPOSITION/PLAN   Discharged    Discharge Note: The patient is stable for discharge home. The signs, symptoms, diagnosis, and discharge instructions have been discussed, understanding conveyed, and agreed upon. The patient is to follow up as recommended or return to ER should their symptoms worsen. PATIENT REFERRED TO:  Follow-up Information       Follow up With Specialties Details Why Contact Info        FOLLOW-UP WITH YOUR OB FOR REPEAT BETA HCG IN TWO DAYS OR RETURN TO ED        MAKE AN APPOINTMENT WITH OB    [address] [place] [address] Emergency Medicine  As needed, If symptoms worsen [address] [facility] [place] [id number] [phone]              DISCHARGE MEDICATIONS:  Current Discharge Medication List        START taking these medications    Details   prenatal vit no.130-iron-folic 27 mg iron- 608 mcg tab tablet Take 1 Tablet by mouth daily.   Qty: 30 Tablet, Refills: 1  Start date: 1/28/2023               DISCONTINUED MEDICATIONS:  Current Discharge Medication List          I am the Primary Clinician of Record: Rosetta Mancini MD (electronically signed)    (Please note that parts of this dictation were completed with voice recognition software. Quite often unanticipated grammatical, syntax, homophones, and other interpretive errors are inadvertently transcribed by the computer software. Please disregards these errors.  Please excuse any errors that have escaped final proofreading.)

## 2023-01-29 ENCOUNTER — APPOINTMENT (OUTPATIENT)
Dept: ULTRASOUND IMAGING | Age: 38
End: 2023-01-29
Attending: STUDENT IN AN ORGANIZED HEALTH CARE EDUCATION/TRAINING PROGRAM
Payer: COMMERCIAL

## 2023-01-29 ENCOUNTER — HOSPITAL ENCOUNTER (EMERGENCY)
Age: 38
Discharge: HOME | End: 2023-01-29
Attending: STUDENT IN AN ORGANIZED HEALTH CARE EDUCATION/TRAINING PROGRAM
Payer: COMMERCIAL

## 2023-01-29 VITALS
RESPIRATION RATE: 18 BRPM | HEIGHT: 62 IN | WEIGHT: 200 LBS | BODY MASS INDEX: 36.8 KG/M2 | HEART RATE: 80 BPM | TEMPERATURE: 97.9 F | SYSTOLIC BLOOD PRESSURE: 133 MMHG | OXYGEN SATURATION: 100 % | DIASTOLIC BLOOD PRESSURE: 90 MMHG

## 2023-01-29 DIAGNOSIS — D21.9 FIBROID: ICD-10-CM

## 2023-01-29 DIAGNOSIS — O46.90 VAGINAL BLEEDING IN PREGNANCY: Primary | ICD-10-CM

## 2023-01-29 LAB
ALBUMIN SERPL-MCNC: 3.6 G/DL (ref 3.5–5)
ALBUMIN/GLOB SERPL: 0.8 (ref 1.1–2.2)
ALP SERPL-CCNC: 77 U/L (ref 45–117)
ALT SERPL-CCNC: 25 U/L (ref 12–78)
ANION GAP SERPL CALC-SCNC: 3 MMOL/L (ref 5–15)
AST SERPL W P-5'-P-CCNC: 21 U/L (ref 15–37)
BILIRUB SERPL-MCNC: 0.2 MG/DL (ref 0.2–1)
BUN SERPL-MCNC: 9 MG/DL (ref 6–20)
BUN/CREAT SERPL: 13 (ref 12–20)
CA-I BLD-MCNC: 9.4 MG/DL (ref 8.5–10.1)
CHLORIDE SERPL-SCNC: 108 MMOL/L (ref 97–108)
CO2 SERPL-SCNC: 26 MMOL/L (ref 21–32)
CREAT SERPL-MCNC: 0.68 MG/DL (ref 0.55–1.02)
GLOBULIN SER CALC-MCNC: 4.4 G/DL (ref 2–4)
GLUCOSE SERPL-MCNC: 86 MG/DL (ref 65–100)
HCG SERPL QL: POSITIVE
HCG SERPL-ACNC: 469 MIU/ML (ref 0–6)
POTASSIUM SERPL-SCNC: 4.5 MMOL/L (ref 3.5–5.1)
PROT SERPL-MCNC: 8 G/DL (ref 6.4–8.2)
SODIUM SERPL-SCNC: 137 MMOL/L (ref 136–145)

## 2023-01-29 PROCEDURE — 84703 CHORIONIC GONADOTROPIN ASSAY: CPT

## 2023-01-29 PROCEDURE — 80053 COMPREHEN METABOLIC PANEL: CPT

## 2023-01-29 PROCEDURE — 96372 THER/PROPH/DIAG INJ SC/IM: CPT

## 2023-01-29 PROCEDURE — 36415 COLL VENOUS BLD VENIPUNCTURE: CPT

## 2023-01-29 PROCEDURE — 76801 OB US < 14 WKS SINGLE FETUS: CPT

## 2023-01-29 PROCEDURE — 76817 TRANSVAGINAL US OBSTETRIC: CPT

## 2023-01-29 PROCEDURE — 99284 EMERGENCY DEPT VISIT MOD MDM: CPT

## 2023-01-29 PROCEDURE — 74011250636 HC RX REV CODE- 250/636: Performed by: STUDENT IN AN ORGANIZED HEALTH CARE EDUCATION/TRAINING PROGRAM

## 2023-01-29 PROCEDURE — 84702 CHORIONIC GONADOTROPIN TEST: CPT

## 2023-01-29 RX ORDER — CEPHALEXIN 500 MG/1
500 CAPSULE ORAL 4 TIMES DAILY
Qty: 20 CAPSULE | Refills: 0 | Status: SHIPPED | OUTPATIENT
Start: 2023-01-29 | End: 2023-02-03

## 2023-01-29 RX ADMIN — HUMAN RHO(D) IMMUNE GLOBULIN 0.3 MG: 300 INJECTION, SOLUTION INTRAMUSCULAR at 15:10

## 2023-01-29 NOTE — Clinical Note
[facility] EMERGENCY DEPT  [address] [place] [address] [place] [id number] [phone]    Work/School Note    Date: 1/29/2023    To Whom It May concern:      Tanesha Mary was seen and treated today in the emergency room by the following provider(s):  Attending Provider: Jaclyn Molina MD.      Tanesha Mary is excused from work/school on 01/29/23. She is clear to return to work/school on 01/30/23.         Sincerely,          Clive Basurto LPN

## 2023-01-29 NOTE — ED PROVIDER NOTES
[facility] EMERGENCY DEPT  EMERGENCY DEPARTMENT HISTORY AND PHYSICAL EXAM      Date: 2023  Patient Name: Mike Le  MRN: [id number]  [place] 1985  Date of evaluation: 2023  Provider: Alina Finn MD   Note Started: 2:55 PM 23    HISTORY OF PRESENT ILLNESS     Chief Complaint   Patient presents with    Threatened Miscarriage       History Provided By: Patient    HPI: Mike Le, 30 y.o. female  presents for reevaluation of vaginal bleeding. She reports vaginal bleeding intermittently since her last menstrual cycle on . She took a pregnancy test 3 days ago and reports that it was positive. Bleeding increased yesterday and she was evaluated at [facility] where transvaginal ultrasound showed no intrauterine pregnancy, but did identify fibroids. hCG at that point was 455. She spoke with her OB today who recommended reevaluation in the emergency department with repeat ultrasound.     PAST MEDICAL HISTORY   Past Medical History:  Past Medical History:   Diagnosis Date    Anemia     Anxiety     Depression     Fallopian tube disorder     Fibroid     Miscarriage        Past Surgical History:  Past Surgical History:   Procedure Laterality Date    HX ORTHOPAEDIC Left 2007    knee       Family History:  Family History   Problem Relation Age of Onset    Stroke Mother     Stroke Sister     Colon Cancer Maternal Grandmother        Social History:  Social History     Tobacco Use    Smoking status: Former     Years: 10.00     Types: Cigarettes    Smokeless tobacco: Never    Tobacco comments:     smokes black and milds   Vaping Use    Vaping Use: Former    Substances: Flavoring    Devices: Disposable   Substance Use Topics    Alcohol use: Not Currently     Comment: occasional    Drug use: Never       Allergies:  No Known Allergies    PCP: Paula Santillan MD    Current Meds:   Previous Medications    NAPROXEN (NAPROSYN) 500 MG TABLET    Take 1 Tablet by mouth every twelve (12) hours as needed for Pain. ONDANSETRON (ZOFRAN ODT) 4 MG DISINTEGRATING TABLET    Take 1 Tablet by mouth every eight (8) hours as needed for Nausea, Vomiting or Nausea or Vomiting. PRENATAL VIT NO.130-IRON-FOLIC 27 MG IRON- 133 MCG TAB TABLET    Take 1 Tablet by mouth daily. REVIEW OF SYSTEMS   Review of Systems   Constitutional:  Negative for fever. HENT:  Negative for congestion. Respiratory:  Negative for cough and shortness of breath. Cardiovascular:  Negative for chest pain. Gastrointestinal:  Negative for abdominal pain, constipation, nausea and vomiting. Genitourinary:  Positive for vaginal bleeding. Negative for dysuria. Musculoskeletal:  Negative for arthralgias and myalgias. Skin:  Negative for rash. Allergic/Immunologic: Negative for immunocompromised state. Neurological:  Negative for syncope. Psychiatric/Behavioral:  Negative for confusion. Positives and Pertinent negatives as per HPI. PHYSICAL EXAM     ED Triage Vitals [01/29/23 1349]   ED Encounter Vitals Group      BP (!) 133/90      Pulse (Heart Rate) 80      Resp Rate 18      Temp 97.9 °F (36.6 °C)      Temp src       O2 Sat (%) 100 %      Weight 200 lb      Height 5' 2\"     Physical Exam  Constitutional:       Appearance: Normal appearance. HENT:      Head: Normocephalic and atraumatic. Nose: Nose normal.      Mouth/Throat:      Mouth: Mucous membranes are moist.   Eyes:      Conjunctiva/sclera: Conjunctivae normal.      Pupils: Pupils are equal, round, and reactive to light. Cardiovascular:      Rate and Rhythm: Normal rate and regular rhythm. Heart sounds: Normal heart sounds. Pulmonary:      Effort: Pulmonary effort is normal.      Breath sounds: Normal breath sounds. Abdominal:      General: There is no distension. Palpations: Abdomen is soft. Tenderness: There is no abdominal tenderness. Musculoskeletal:         General: No tenderness or deformity. Normal range of motion. Cervical back: Normal range of motion and neck supple. Skin:     General: Skin is warm and dry. Neurological:      General: No focal deficit present. Mental Status: She is alert and oriented to person, place, and time. Psychiatric:         Mood and Affect: Mood normal.         Behavior: Behavior normal.          SCREENINGS               No data recorded        LAB, EKG AND DIAGNOSTIC RESULTS   Labs:  Recent Results (from the past 12 hour(s))   BETA HCG, QT    Collection Time: 01/29/23  2:08 PM   Result Value Ref Range    Beta HCG, .0 (H) 0 - 6 mIU/mL   HCG QL SERUM    Collection Time: 01/29/23  2:08 PM   Result Value Ref Range    HCG, Ql. Positive (A) Negative     METABOLIC PANEL, COMPREHENSIVE    Collection Time: 01/29/23  2:08 PM   Result Value Ref Range    Sodium 137 136 - 145 mmol/L    Potassium 4.5 3.5 - 5.1 mmol/L    Chloride 108 97 - 108 mmol/L    CO2 26 21 - 32 mmol/L    Anion gap 3 (L) 5 - 15 mmol/L    Glucose 86 65 - 100 mg/dL    BUN 9 6 - 20 mg/dL    Creatinine 0.68 0.55 - 1.02 mg/dL    BUN/Creatinine ratio 13 12 - 20      eGFR >60 >60 ml/min/1.73m2    Calcium 9.4 8.5 - 10.1 mg/dL    Bilirubin, total 0.2 0.2 - 1.0 mg/dL    AST (SGOT) 21 15 - 37 U/L    ALT (SGPT) 25 12 - 78 U/L    Alk.  phosphatase 77 45 - 117 U/L    Protein, total 8.0 6.4 - 8.2 g/dL    Albumin 3.6 3.5 - 5.0 g/dL    Globulin 4.4 (H) 2.0 - 4.0 g/dL    A-G Ratio 0.8 (L) 1.1 - 2.2     RH IMMUNE GLOBULIN PROPHYLACTIC    Collection Time: 01/29/23  3:30 PM   Result Value Ref Range    No. of weeks gestation UNKNOWN     Unit number [id number]     Blood component type RH IMMUNE GLOBULIN     Unit division 00     Status of unit [address] to transfuse        Radiologic Studies:  Interpretation per the Radiologist below, if available at the time of this note:  US UTS TRANSVAGINAL OB    Result Date: 1/29/2023  INDICATION:  Threatened miscarriage COMPARISON:  Ultrasound yesterday FINDINGS:  Realtime sonographic imaging of the pelvis was performed transabdominally. The uterus measures 8.3 x 5.8 x 6.3 cm. Within the uterus, again demonstrated are fibroids. There is no evidence of intrauterine gestation. The right ovary measures 3.5 cm and is normal. The left ovary is not visualized by transabdominal technique due to bowel gas. To further evaluate the uterus, transvaginal sonography was necessary. The examination demonstrates no evidence of intrauterine gestation. Fibroids are redemonstrated, measuring up to 5.7 cm and 4.5 cm, respectively. The ovaries are normal, measuring 3.1 cm each, both with intact vascularity. There is no free fluid or adnexal mass. 1. No significant change. No evidence of intrauterine gestation. 2. Uterine fibroids, obscuring portions of the endometrium. 3. Normal ovaries and adnexa. Recommend correlation with dates and beta hCG, and consider serial beta hCG analysis. US UTS TRANSVAGINAL OB    Result Date: 1/28/2023  LIMITED TRANSABDOMINAL AND TRANSVAGINAL PELVIC ULTRASOUND WITH PELVIC DOPPLER. 1/28/2023 5:50 PM INDICATION: Vaginal bleeding, pregnant. COMPARISON: None. TECHNIQUE: Limited grayscale, color, and Doppler ultrasound imaging of the pelvis was performed both transabdominally and transvaginally. FINDINGS: No intrauterine pregnancy shown. The endometrium is obscured by fundal fibroids. A pedunculated fibroid in the left adnexa measures 40 mm. The ovaries are not shown. Intramuscular fundal fibroid. Pedunculated left fibroid. Fibroids obscure endometrium; no intrauterine pregnancy shown. US PREG UTS < 14 WKS SNGL    Result Date: 1/29/2023  INDICATION:  Threatened miscarriage COMPARISON:  Ultrasound yesterday FINDINGS:  Realtime sonographic imaging of the pelvis was performed transabdominally. The uterus measures 8.3 x 5.8 x 6.3 cm. Within the uterus, again demonstrated are fibroids. There is no evidence of intrauterine gestation.  The right ovary measures 3.5 cm and is normal. The left ovary is not visualized by transabdominal technique due to bowel gas. To further evaluate the uterus, transvaginal sonography was necessary. The examination demonstrates no evidence of intrauterine gestation. Fibroids are redemonstrated, measuring up to 5.7 cm and 4.5 cm, respectively. The ovaries are normal, measuring 3.1 cm each, both with intact vascularity. There is no free fluid or adnexal mass. 1. No significant change. No evidence of intrauterine gestation. 2. Uterine fibroids, obscuring portions of the endometrium. 3. Normal ovaries and adnexa. Recommend correlation with dates and beta hCG, and consider serial beta hCG analysis. US PREG UTS < 14 WKS SNGL    Result Date: 1/28/2023  LIMITED TRANSABDOMINAL AND TRANSVAGINAL PELVIC ULTRASOUND WITH PELVIC DOPPLER. 1/28/2023 5:50 PM INDICATION: Vaginal bleeding, pregnant. COMPARISON: None. TECHNIQUE: Limited grayscale, color, and Doppler ultrasound imaging of the pelvis was performed both transabdominally and transvaginally. FINDINGS: No intrauterine pregnancy shown. The endometrium is obscured by fundal fibroids. A pedunculated fibroid in the left adnexa measures 40 mm. The ovaries are not shown. Intramuscular fundal fibroid. Pedunculated left fibroid. Fibroids obscure endometrium; no intrauterine pregnancy shown.        PROCEDURES   Unless otherwise noted below, none  Performed by: Sandra Porter MD   Procedures        EMERGENCY DEPARTMENT COURSE and DIFFERENTIAL DIAGNOSIS/MDM   Vitals:    Vitals:    01/29/23 1349   BP: (!) 133/90   Pulse: 80   Resp: 18   Temp: 97.9 °F (36.6 °C)   SpO2: 100%   Weight: 90.7 kg (200 lb)   Height: 5' 2\" (1.575 m)        Patient was given the following medications:  Medications   rho D immune globulin (RHOGAM) 1,500 unit (300 mcg) injection 0.3 mg (0.3 mg IntraMUSCular Given 1/29/23 1510)       CC/HPI Summary, DDx, ED Course, and Reassessment:   72-year-old female presents for evaluation of vaginal bleeding. She was seen in the emergency department yesterday her transvaginal ultrasound showed no intrauterine pregnancy. hCG at that time was 455. She Alexx Marie presents today at the recommendation of her OB. We will repeat quantitative hCG and repeat transvaginal ultrasound. Suspect threatened or completed miscarriage  ED Course as of 01/29/23 1650   Sun Jan 29, 2023   1618 Consult Call  Dr. Zoltan Elias likely still too early to identify intrauterine pregnancy. Agrees with RhoGAM and prophylactic antibiotics for bacteriuria and recommends follow-up in OB clinic within the next 3 days [BQ]      ED Course User Index  [BQ] Baylee Sanchez MD           ED FINAL IMPRESSION     1. Vaginal bleeding in pregnancy    2. Fibroid          DISPOSITION/PLAN   Discharged    Discharge Note: The patient is stable for discharge home. The signs, symptoms, diagnosis, and discharge instructions have been discussed, understanding conveyed, and agreed upon. The patient is to follow up as recommended or return to ER should their symptoms worsen. PATIENT REFERRED TO:  Follow-up Information       Follow up With Specialties Details Why Contact Info    Alan Meehan MD Obstetrics & Gynecology, Gynecology, Obstetrics Schedule an appointment as soon as possible for a visit   [facility]  [phone]                DISCHARGE MEDICATIONS:  Current Discharge Medication List        START taking these medications    Details   cephALEXin (Keflex) 500 mg capsule Take 1 Capsule by mouth four (4) times daily for 5 days. Qty: 20 Capsule, Refills: 0  Start date: 1/29/2023, End date: 2/3/2023               DISCONTINUED MEDICATIONS:  Current Discharge Medication List          I am the Primary Clinician of Record. Fernando Cardoso MD (electronically signed)    (Please note that parts of this dictation were completed with voice recognition software.  Quite often unanticipated grammatical, syntax, homophones, and other interpretive errors are inadvertently transcribed by the computer software. Please disregards these errors.  Please excuse any errors that have escaped final proofreading.)

## 2023-01-29 NOTE — Clinical Note
[facility] EMERGENCY DEPT  [address] [place] [id number]  [phone]    Work/School Note    Date: 1/29/2023    To Whom It May concern:      Billy Huber was seen and treated today in the emergency room by the following provider(s):  Attending Provider: Catalino Bryson MD.      Billy Huber is excused from work/school on 01/29/23. She is clear to return to work/school on 01/30/23.         Sincerely,          Jayme Morales MD

## 2023-01-29 NOTE — ED TRIAGE NOTES
Pt went to [facility] yesterday when she found out she was pregnant because last year she she was diagnosed with uterine fibroids and fluid on her fallopian tubes. US was inconclusive, she spoke with [facility] regarding history and results who wanted her to be seen in ER.  LMP was 01/17/2023 with heavy bleeding, states her cycle is irregular but heavy bleeding is normal.

## 2023-01-29 NOTE — DISCHARGE INSTRUCTIONS
Thank you! Thank you for allowing me to care for you in the emergency department. It is my goal to provide you with excellent care. If you have not received excellent quality care, please ask to speak to the nurse manager. Please fill out the survey that will come to you by mail or email since we listen to your feedback! Below you will find a list of your tests from today's visit. Should you have any questions, please do not hesitate to call the emergency department. Labs  Recent Results (from the past 12 hour(s))   BETA HCG, QT    Collection Time: 01/29/23  2:08 PM   Result Value Ref Range    Beta HCG, .0 (H) 0 - 6 mIU/mL   HCG QL SERUM    Collection Time: 01/29/23  2:08 PM   Result Value Ref Range    HCG, Ql. Positive (A) Negative     METABOLIC PANEL, COMPREHENSIVE    Collection Time: 01/29/23  2:08 PM   Result Value Ref Range    Sodium 137 136 - 145 mmol/L    Potassium 4.5 3.5 - 5.1 mmol/L    Chloride 108 97 - 108 mmol/L    CO2 26 21 - 32 mmol/L    Anion gap 3 (L) 5 - 15 mmol/L    Glucose 86 65 - 100 mg/dL    BUN 9 6 - 20 mg/dL    Creatinine 0.68 0.55 - 1.02 mg/dL    BUN/Creatinine ratio 13 12 - 20      eGFR >60 >60 ml/min/1.73m2    Calcium 9.4 8.5 - 10.1 mg/dL    Bilirubin, total 0.2 0.2 - 1.0 mg/dL    AST (SGOT) 21 15 - 37 U/L    ALT (SGPT) 25 12 - 78 U/L    Alk. phosphatase 77 45 - 117 U/L    Protein, total 8.0 6.4 - 8.2 g/dL    Albumin 3.6 3.5 - 5.0 g/dL    Globulin 4.4 (H) 2.0 - 4.0 g/dL    A-G Ratio 0.8 (L) 1.1 - 2.2     RH IMMUNE GLOBULIN PROPHYLACTIC    Collection Time: 01/29/23  3:30 PM   Result Value Ref Range    No. of weeks gestation UNKNOWN     Unit number [id number]     Blood component type RH IMMUNE GLOBULIN     Unit division 00     Status of unit [address] to transfuse        Radiologic Studies  US PREG UTS < 14 WKS SNGL   Final Result      1. No significant change. No evidence of intrauterine gestation.    2. Uterine fibroids, obscuring portions of the endometrium. 3. Normal ovaries and adnexa. Recommend correlation with dates and beta hCG, and consider serial beta hCG   analysis. US UTS TRANSVAGINAL OB   Final Result      1. No significant change. No evidence of intrauterine gestation. 2. Uterine fibroids, obscuring portions of the endometrium. 3. Normal ovaries and adnexa. Recommend correlation with dates and beta hCG, and consider serial beta hCG   analysis. CT Results  (Last 48 hours)      None          CXR Results  (Last 48 hours)      None          ------------------------------------------------------------------------------------------------------------  The exam and treatment you received in the Emergency Department were for an urgent problem and are not intended as complete care. It is important that you follow-up with a doctor, nurse practitioner, or physician assistant to:  (1) confirm your diagnosis,  (2) re-evaluation of changes in your illness and treatment, and  (3) for ongoing care. Please take your discharge instructions with you when you go to your follow-up appointment. If you have any problem arranging a follow-up appointment, contact the Emergency Department. If your symptoms become worse or you do not improve as expected and you are unable to reach your health care provider, please return to the Emergency Department. We are available 24 hours a day. If a prescription has been provided, please have it filled as soon as possible to prevent a delay in treatment. If you have any questions or reservations about taking the medication due to side effects or interactions with other medications, please call your primary care provider or contact the ER.

## 2023-01-30 LAB
BLD PROD TYP BPU: NORMAL
BPU ID: NORMAL
GA (WEEKS): NORMAL WK
STATUS OF UNIT,%ST: NORMAL
TRANSFUSION STATUS PATIENT QL: NORMAL
UNIT DIVISION, %UDIV: 0

## 2023-01-31 ENCOUNTER — HOSPITAL ENCOUNTER (EMERGENCY)
Age: 38
Discharge: HOME | End: 2023-01-31
Attending: EMERGENCY MEDICINE
Payer: COMMERCIAL

## 2023-01-31 ENCOUNTER — APPOINTMENT (OUTPATIENT)
Dept: ULTRASOUND IMAGING | Age: 38
End: 2023-01-31
Attending: EMERGENCY MEDICINE
Payer: COMMERCIAL

## 2023-01-31 VITALS
OXYGEN SATURATION: 100 % | HEIGHT: 62 IN | BODY MASS INDEX: 36.8 KG/M2 | DIASTOLIC BLOOD PRESSURE: 93 MMHG | TEMPERATURE: 98.3 F | SYSTOLIC BLOOD PRESSURE: 132 MMHG | WEIGHT: 200 LBS | RESPIRATION RATE: 18 BRPM | HEART RATE: 81 BPM

## 2023-01-31 DIAGNOSIS — O46.90 VAGINAL BLEEDING IN PREGNANCY: Primary | ICD-10-CM

## 2023-01-31 LAB
ABO + RH BLD: NORMAL
ALBUMIN SERPL-MCNC: 3.6 G/DL (ref 3.5–5)
ALBUMIN/GLOB SERPL: 0.7 (ref 1.1–2.2)
ALP SERPL-CCNC: 80 U/L (ref 45–117)
ALT SERPL-CCNC: 27 U/L (ref 12–78)
ANION GAP SERPL CALC-SCNC: 4 MMOL/L (ref 5–15)
AST SERPL W P-5'-P-CCNC: 16 U/L (ref 15–37)
BASOPHILS # BLD: 0 K/UL (ref 0–0.1)
BASOPHILS NFR BLD: 0 % (ref 0–1)
BILIRUB SERPL-MCNC: 0.2 MG/DL (ref 0.2–1)
BLOOD GROUP ANTIBODIES SERPL: NORMAL
BLOOD GROUP ANTIBODIES SERPL: POSITIVE
BUN SERPL-MCNC: 9 MG/DL (ref 6–20)
BUN/CREAT SERPL: 13 (ref 12–20)
CA-I BLD-MCNC: 9.5 MG/DL (ref 8.5–10.1)
CHLORIDE SERPL-SCNC: 105 MMOL/L (ref 97–108)
CO2 SERPL-SCNC: 24 MMOL/L (ref 21–32)
CREAT SERPL-MCNC: 0.7 MG/DL (ref 0.55–1.02)
DAT POLY-SP REAG RBC QL: NEGATIVE
DIFFERENTIAL METHOD BLD: ABNORMAL
EOSINOPHIL # BLD: 0.1 K/UL (ref 0–0.4)
EOSINOPHIL NFR BLD: 2 % (ref 0–7)
ERYTHROCYTE [DISTWIDTH] IN BLOOD BY AUTOMATED COUNT: 15.5 % (ref 11.5–14.5)
GLOBULIN SER CALC-MCNC: 4.9 G/DL (ref 2–4)
GLUCOSE SERPL-MCNC: 82 MG/DL (ref 65–100)
HCG SERPL-ACNC: 456 MIU/ML (ref 0–6)
HCT VFR BLD AUTO: 37.9 % (ref 35–47)
HGB BLD-MCNC: 12.3 G/DL (ref 11.5–16)
IMM GRANULOCYTES # BLD AUTO: 0 K/UL (ref 0–0.04)
IMM GRANULOCYTES NFR BLD AUTO: 0 % (ref 0–0.5)
LYMPHOCYTES # BLD: 2.4 K/UL (ref 0.8–3.5)
LYMPHOCYTES NFR BLD: 44 % (ref 12–49)
MCH RBC QN AUTO: 26.6 PG (ref 26–34)
MCHC RBC AUTO-ENTMCNC: 32.5 G/DL (ref 30–36.5)
MCV RBC AUTO: 81.9 FL (ref 80–99)
MONOCYTES # BLD: 0.4 K/UL (ref 0–1)
MONOCYTES NFR BLD: 7 % (ref 5–13)
NEUTS SEG # BLD: 2.6 K/UL (ref 1.8–8)
NEUTS SEG NFR BLD: 47 % (ref 32–75)
NRBC # BLD: 0 K/UL (ref 0–0.01)
NRBC BLD-RTO: 0 PER 100 WBC
PLATELET # BLD AUTO: 282 K/UL (ref 150–400)
PMV BLD AUTO: 11.4 FL (ref 8.9–12.9)
POTASSIUM SERPL-SCNC: 3.6 MMOL/L (ref 3.5–5.1)
PROT SERPL-MCNC: 8.5 G/DL (ref 6.4–8.2)
RBC # BLD AUTO: 4.63 M/UL (ref 3.8–5.2)
SODIUM SERPL-SCNC: 133 MMOL/L (ref 136–145)
SPECIMEN EXP DATE BLD: NORMAL
WBC # BLD AUTO: 5.5 K/UL (ref 3.6–11)
XXAUTO CONTROL: POSITIVE

## 2023-01-31 PROCEDURE — 76817 TRANSVAGINAL US OBSTETRIC: CPT

## 2023-01-31 PROCEDURE — 86870 RBC ANTIBODY IDENTIFICATION: CPT

## 2023-01-31 PROCEDURE — 84702 CHORIONIC GONADOTROPIN TEST: CPT

## 2023-01-31 PROCEDURE — 85025 COMPLETE CBC W/AUTO DIFF WBC: CPT

## 2023-01-31 PROCEDURE — 86900 BLOOD TYPING SEROLOGIC ABO: CPT

## 2023-01-31 PROCEDURE — 99284 EMERGENCY DEPT VISIT MOD MDM: CPT

## 2023-01-31 PROCEDURE — 74011250637 HC RX REV CODE- 250/637: Performed by: EMERGENCY MEDICINE

## 2023-01-31 PROCEDURE — 80053 COMPREHEN METABOLIC PANEL: CPT

## 2023-01-31 PROCEDURE — 36415 COLL VENOUS BLD VENIPUNCTURE: CPT

## 2023-01-31 RX ORDER — HYDROCODONE BITARTRATE AND ACETAMINOPHEN 5; 325 MG/1; MG/1
1 TABLET ORAL
Qty: 12 TABLET | Refills: 0 | Status: SHIPPED | OUTPATIENT
Start: 2023-01-31 | End: 2023-02-03

## 2023-01-31 RX ORDER — MISOPROSTOL 200 UG/1
800 TABLET ORAL ONCE
Status: COMPLETED | OUTPATIENT
Start: 2023-01-31 | End: 2023-01-31

## 2023-01-31 RX ADMIN — MISOPROSTOL 800 MCG: 200 TABLET ORAL at 17:28

## 2023-01-31 NOTE — ED TRIAGE NOTES
Patient seen by urgent care advised she was having a miscarriage and to follow up with OB. Patient tried to make appointment with OB who advised her to go back to ER.

## 2023-01-31 NOTE — ED PROVIDER NOTES
EMERGENCY DEPARTMENT HISTORY AND PHYSICAL EXAM      Date: 1/31/2023  Patient Name: Terri Hughes    History of Presenting Illness     Chief Complaint   Patient presents with    Vaginal Bleeding       History Provided By: Patient    HPI: Terri Hughes, 30 y.o. female with a past medical history significant No significant past medical history presents to the ED with chief complaint of Vaginal Bleeding  . 29-year-old with active vaginal bleeding. Recent positive pregnancy test.  Has been having multiple lab tests ultrasound not showing any IUP concern for potential miscarriage. Patient has been following by Dr. Jeane Ro through [facility] AND [facility]. There are no other complaints, changes, or physical findings at this time. PCP: Catie Little MD    Current Outpatient Medications   Medication Sig Dispense Refill    cephALEXin (Keflex) 500 mg capsule Take 1 Capsule by mouth four (4) times daily for 5 days. 20 Capsule 0    prenatal vit no.130-iron-folic 27 mg iron- 129 mcg tab tablet Take 1 Tablet by mouth daily. 30 Tablet 1    naproxen (NAPROSYN) 500 mg tablet Take 1 Tablet by mouth every twelve (12) hours as needed for Pain. 20 Tablet 0    ondansetron (Zofran ODT) 4 mg disintegrating tablet Take 1 Tablet by mouth every eight (8) hours as needed for Nausea, Vomiting or Nausea or Vomiting.  12 Tablet 0       Past History     Past Medical History:  Past Medical History:   Diagnosis Date    Anemia     Anxiety     Depression     Fallopian tube disorder     Fibroid     Miscarriage        Past Surgical History:  Past Surgical History:   Procedure Laterality Date    HX ORTHOPAEDIC Left 2007    knee       Family History:  Family History   Problem Relation Age of Onset    Stroke Mother     Stroke Sister     Colon Cancer Maternal Grandmother        Social History:  Social History     Tobacco Use    Smoking status: Former     Years: 10.00     Types: Cigarettes    Smokeless tobacco: Never Tobacco comments:     smokes black and milds   Vaping Use    Vaping Use: Former    Substances: Flavoring    Devices: Disposable   Substance Use Topics    Alcohol use: Not Currently     Comment: occasional    Drug use: Never       Allergies:  No Known Allergies      Review of Systems   Review of Systems   Constitutional: Negative. Negative for chills, fatigue and fever. HENT: Negative. Negative for congestion, nosebleeds and sore throat. Eyes: Negative. Negative for pain, discharge and visual disturbance. Respiratory: Negative. Negative for cough, chest tightness and shortness of breath. Cardiovascular:  Negative for chest pain, palpitations and leg swelling. Gastrointestinal:  Negative for abdominal pain, blood in stool, constipation, diarrhea, nausea and vomiting. Endocrine: Negative. Genitourinary:  Positive for vaginal bleeding. Negative for difficulty urinating, dysuria and pelvic pain. Musculoskeletal: Negative. Negative for arthralgias, back pain and myalgias. Skin: Negative. Negative for rash and wound. Allergic/Immunologic: Negative. Neurological: Negative. Negative for dizziness, syncope, weakness, numbness and headaches. Hematological: Negative. Psychiatric/Behavioral: Negative. Negative for agitation, confusion and suicidal ideas. All other systems reviewed and are negative. Positives and Pertinent negatives as per HPI. Physical Exam   Patient Vitals for the past 24 hrs:   Temp Pulse Resp BP SpO2   01/31/23 1327 98.3 °F (36.8 °C) 81 18 (!) 132/93 100 %         Physical Exam  Vitals and nursing note reviewed. Exam conducted with a chaperone present. Constitutional:       Appearance: Normal appearance. She is normal weight. HENT:      Head: Normocephalic and atraumatic. Nose: Nose normal.      Mouth/Throat:      Mouth: Mucous membranes are moist.      Pharynx: Oropharynx is clear. Eyes:      Extraocular Movements: Extraocular movements intact. Conjunctiva/sclera: Conjunctivae normal.      Pupils: Pupils are equal, round, and reactive to light. Cardiovascular:      Rate and Rhythm: Normal rate and regular rhythm. Pulses: Normal pulses. Heart sounds: Normal heart sounds. Pulmonary:      Effort: Pulmonary effort is normal. No respiratory distress. Breath sounds: Normal breath sounds. Abdominal:      General: Abdomen is flat. Bowel sounds are normal. There is no distension. Palpations: Abdomen is soft. Tenderness: There is no abdominal tenderness. There is no guarding. Musculoskeletal:         General: No swelling, tenderness, deformity or signs of injury. Normal range of motion. Cervical back: Normal range of motion and neck supple. Right lower leg: No edema. Left lower leg: No edema. Skin:     General: Skin is warm and dry. Capillary Refill: Capillary refill takes less than 2 seconds. Findings: No lesion or rash. Neurological:      General: No focal deficit present. Mental Status: She is alert and oriented to person, place, and time. Mental status is at baseline. Cranial Nerves: No cranial nerve deficit. Psychiatric:         Mood and Affect: Mood normal.         Behavior: Behavior normal.         Thought Content:  Thought content normal.         Judgment: Judgment normal.       Diagnostic Study Results     LABS:   Recent Results (from the past 12 hour(s))   CBC WITH AUTOMATED DIFF    Collection Time: 01/31/23  2:19 PM   Result Value Ref Range    WBC 5.5 3.6 - 11.0 K/uL    RBC 4.63 3.80 - 5.20 M/uL    HGB 12.3 11.5 - 16.0 g/dL    HCT 37.9 35.0 - 47.0 %    MCV 81.9 80.0 - 99.0 FL    MCH 26.6 26.0 - 34.0 PG    MCHC 32.5 30.0 - 36.5 g/dL    RDW 15.5 (H) 11.5 - 14.5 %    PLATELET 175 482 - 101 K/uL    MPV 11.4 8.9 - 12.9 FL    NRBC 0.0 0.0  WBC    ABSOLUTE NRBC 0.00 0.00 - 0.01 K/uL    NEUTROPHILS 47 32 - 75 %    LYMPHOCYTES 44 12 - 49 %    MONOCYTES 7 5 - 13 %    EOSINOPHILS 2 0 - 7 %    BASOPHILS 0 0 - 1 %    IMMATURE GRANULOCYTES 0 0 - 0.5 %    ABS. NEUTROPHILS 2.6 1.8 - 8.0 K/UL    ABS. LYMPHOCYTES 2.4 0.8 - 3.5 K/UL    ABS. MONOCYTES 0.4 0.0 - 1.0 K/UL    ABS. EOSINOPHILS 0.1 0.0 - 0.4 K/UL    ABS. BASOPHILS 0.0 0.0 - 0.1 K/UL    ABS. IMM. GRANS. 0.0 0.00 - 0.04 K/UL    DF AUTOMATED     METABOLIC PANEL, COMPREHENSIVE    Collection Time: 01/31/23  2:19 PM   Result Value Ref Range    Sodium 133 (L) 136 - 145 mmol/L    Potassium 3.6 3.5 - 5.1 mmol/L    Chloride 105 97 - 108 mmol/L    CO2 24 21 - 32 mmol/L    Anion gap 4 (L) 5 - 15 mmol/L    Glucose 82 65 - 100 mg/dL    BUN 9 6 - 20 mg/dL    Creatinine 0.70 0.55 - 1.02 mg/dL    BUN/Creatinine ratio 13 12 - 20      eGFR >60 >60 ml/min/1.73m2    Calcium 9.5 8.5 - 10.1 mg/dL    Bilirubin, total 0.2 0.2 - 1.0 mg/dL    AST (SGOT) 16 15 - 37 U/L    ALT (SGPT) 27 12 - 78 U/L    Alk. phosphatase 80 45 - 117 U/L    Protein, total 8.5 (H) 6.4 - 8.2 g/dL    Albumin 3.6 3.5 - 5.0 g/dL    Globulin 4.9 (H) 2.0 - 4.0 g/dL    A-G Ratio 0.7 (L) 1.1 - 2.2     BETA HCG, QT    Collection Time: 01/31/23  2:19 PM   Result Value Ref Range    Beta HCG, .0 (H) 0 - 6 mIU/mL        EKG: EKG interpreted independently by ER physician. RADIOLOGY:  Non-plain film images such as CT, Ultrasound and MRI are read by the radiologist. Plain radiographic images are visualized and preliminarily interpreted by the ED Provider with the below findings:          Interpretation per the Radiologist below, if available at the time of this note:     No results found. CT Results  (Last 48 hours)      None          CXR Results  (Last 48 hours)      None            Medical Decision Making and ED Course       CC/HPI Summary, DDx, ED Course, and Reassessment: 79-year-old female with ongoing vaginal bleeding concern for miscarriage versus ectopic. Plan for lab work including beta hCG ultrasound. Patient is Rh- but just received RhoGAM day ago.     These orders were placed during the ER evaluation:  Orders Placed This Encounter    US UTS TRANSVAGINAL OB     Standing Status:   Standing     Number of Occurrences:   1     Order Specific Question:   Transport     Answer:   BED [2]     Order Specific Question:   Reason for Exam     Answer:   vb     Order Specific Question:   Is Patient Pregnant? Answer:   Yes    CBC WITH AUTOMATED DIFF     Standing Status:   Standing     Number of Occurrences:   1    COMPREHENSIVE METABOLIC PANEL     Standing Status:   Standing     Number of Occurrences:   1    HCG QT     Standing Status:   Standing     Number of Occurrences:   1    TYPE & SCREEN     ENTER SURGERY DATE IF FOR PRE-OP TESTING. Standing Status:   Standing     Number of Occurrences:   1     Order Specific Question:   Has patient been transfused or pregnant in the last 3 mos. ? Answer:   Unknown        Patient was given the following medications:  Medications - No data to display        ED Course:       ED Course as of 01/31/23 1518   Tue Jan 31, 2023   1516 Beta HCG, QT(!): 456.0 [HP]   1517 Hcg 469 on 1/29 [HP]      ED Course User Index  [HP] Nevaeh Bhardwaj MD         Vital Signs-Reviewed the patient's vital signs. Patient Vitals for the past 24 hrs:   Temp Pulse Resp BP SpO2   01/31/23 1327 98.3 °F (36.8 °C) 81 18 (!) 132/93 100 %     Vitals interpreted independently by ER physician. stable    Medical decision making tools:                No data recorded          Disposition Considerations (Tests not done, Shared Decision Making, Pt Expectation of Test or Tx.): Patient's vitals are stable. Awaiting beta-hCG for comparison. And repeat ultrasound for evaluation for potential ectopic. CONSULTS: (Who and What was discussed)  None    Chronic Conditions: no    Social Determinants affecting Dx or Tx: None    Records Reviewed (source and summary of external notes): None  Records Reviewed: Previous Hospital chart. EMS run report.   I reviewed the vital signs, available nursing notes, past medical history, past surgical history, family history and social history. Initial assessment performed. The patients presenting problems have been discussed, and they are in agreement with the care plan formulated and outlined with them. I have encouraged them to ask questions as they arise throughout their visit. Procedures               Disposition       Emergency Department Disposition:        Diagnosis     Clinical Impression:   1. Vaginal bleeding in pregnancy        Attestations:    Sonia Bergman MD    Please note that this dictation was completed with [employer], the computer voice recognition software. Quite often unanticipated grammatical, syntax, homophones, and other interpretive errors are inadvertently transcribed by the computer software. Please disregard these errors. Please excuse any errors that have escaped final proofreading. Thank you.

## 2023-02-01 DIAGNOSIS — O02.1 MISSED ABORTION: Primary | ICD-10-CM

## 2023-02-07 ENCOUNTER — TELEPHONE (OUTPATIENT)
Dept: OBGYN CLINIC | Age: 38
End: 2023-02-07

## 2023-02-07 ENCOUNTER — HOSPITAL ENCOUNTER (EMERGENCY)
Age: 38
Discharge: HOME | End: 2023-02-07
Attending: EMERGENCY MEDICINE
Payer: COMMERCIAL

## 2023-02-07 ENCOUNTER — APPOINTMENT (OUTPATIENT)
Dept: ULTRASOUND IMAGING | Age: 38
End: 2023-02-07
Attending: EMERGENCY MEDICINE
Payer: COMMERCIAL

## 2023-02-07 ENCOUNTER — HOSPITAL ENCOUNTER (EMERGENCY)
Age: 38
Discharge: LEFT BEFORE DISCHARGE | End: 2023-02-07
Payer: COMMERCIAL

## 2023-02-07 ENCOUNTER — OFFICE VISIT (OUTPATIENT)
Dept: OBGYN CLINIC | Age: 38
End: 2023-02-07

## 2023-02-07 VITALS
RESPIRATION RATE: 17 BRPM | TEMPERATURE: 98.3 F | HEIGHT: 68 IN | OXYGEN SATURATION: 100 % | WEIGHT: 201 LBS | SYSTOLIC BLOOD PRESSURE: 126 MMHG | BODY MASS INDEX: 30.46 KG/M2 | DIASTOLIC BLOOD PRESSURE: 85 MMHG | HEART RATE: 72 BPM

## 2023-02-07 VITALS
BODY MASS INDEX: 36.99 KG/M2 | HEART RATE: 70 BPM | SYSTOLIC BLOOD PRESSURE: 125 MMHG | OXYGEN SATURATION: 100 % | HEIGHT: 62 IN | WEIGHT: 201 LBS | DIASTOLIC BLOOD PRESSURE: 86 MMHG | RESPIRATION RATE: 16 BRPM

## 2023-02-07 DIAGNOSIS — O00.102 LEFT TUBAL PREGNANCY WITHOUT INTRAUTERINE PREGNANCY: Primary | ICD-10-CM

## 2023-02-07 DIAGNOSIS — D21.9 FIBROIDS: ICD-10-CM

## 2023-02-07 DIAGNOSIS — Z87.59 HISTORY OF MISCARRIAGE: ICD-10-CM

## 2023-02-07 DIAGNOSIS — O02.81 INAPPROPRIATE CHANGE IN QUANTITATIVE HCG IN EARLY PREGNANCY: Primary | ICD-10-CM

## 2023-02-07 LAB
ABO + RH BLD: NORMAL
ALBUMIN SERPL-MCNC: 3.6 G/DL (ref 3.5–5)
ALBUMIN/GLOB SERPL: 0.8 (ref 1.1–2.2)
ALP SERPL-CCNC: 79 U/L (ref 45–117)
ALT SERPL-CCNC: 31 U/L (ref 12–78)
ANION GAP SERPL CALC-SCNC: 3 MMOL/L (ref 5–15)
AST SERPL W P-5'-P-CCNC: 22 U/L (ref 15–37)
BASOPHILS # BLD: 0 K/UL (ref 0–0.1)
BASOPHILS NFR BLD: 1 % (ref 0–1)
BILIRUB SERPL-MCNC: 0.2 MG/DL (ref 0.2–1)
BUN SERPL-MCNC: 8 MG/DL (ref 6–20)
BUN/CREAT SERPL: 10 (ref 12–20)
CA-I BLD-MCNC: 9.2 MG/DL (ref 8.5–10.1)
CHLORIDE SERPL-SCNC: 109 MMOL/L (ref 97–108)
CO2 SERPL-SCNC: 26 MMOL/L (ref 21–32)
CREAT SERPL-MCNC: 0.8 MG/DL (ref 0.55–1.02)
DIFFERENTIAL METHOD BLD: ABNORMAL
EOSINOPHIL # BLD: 0.2 K/UL (ref 0–0.4)
EOSINOPHIL NFR BLD: 4 % (ref 0–7)
ERYTHROCYTE [DISTWIDTH] IN BLOOD BY AUTOMATED COUNT: 15.2 % (ref 11.5–14.5)
GLOBULIN SER CALC-MCNC: 4.6 G/DL (ref 2–4)
GLUCOSE SERPL-MCNC: 102 MG/DL (ref 65–100)
HCG INTACT+B SERPL-ACNC: 1071 MIU/ML
HCG SERPL-ACNC: 900 MIU/ML (ref 0–6)
HCT VFR BLD AUTO: 33.8 % (ref 35–47)
HGB BLD-MCNC: 11.1 G/DL (ref 11.5–16)
IMM GRANULOCYTES # BLD AUTO: 0 K/UL (ref 0–0.04)
IMM GRANULOCYTES NFR BLD AUTO: 0 % (ref 0–0.5)
LYMPHOCYTES # BLD: 2.5 K/UL (ref 0.8–3.5)
LYMPHOCYTES NFR BLD: 43 % (ref 12–49)
MCH RBC QN AUTO: 26.7 PG (ref 26–34)
MCHC RBC AUTO-ENTMCNC: 32.8 G/DL (ref 30–36.5)
MCV RBC AUTO: 81.3 FL (ref 80–99)
MONOCYTES # BLD: 0.4 K/UL (ref 0–1)
MONOCYTES NFR BLD: 6 % (ref 5–13)
NEUTS SEG # BLD: 2.7 K/UL (ref 1.8–8)
NEUTS SEG NFR BLD: 46 % (ref 32–75)
NRBC # BLD: 0 K/UL (ref 0–0.01)
NRBC BLD-RTO: 0 PER 100 WBC
PLATELET # BLD AUTO: 268 K/UL (ref 150–400)
PMV BLD AUTO: 11.3 FL (ref 8.9–12.9)
POTASSIUM SERPL-SCNC: 3.6 MMOL/L (ref 3.5–5.1)
PROT SERPL-MCNC: 8.2 G/DL (ref 6.4–8.2)
RBC # BLD AUTO: 4.16 M/UL (ref 3.8–5.2)
SODIUM SERPL-SCNC: 138 MMOL/L (ref 136–145)
WBC # BLD AUTO: 5.8 K/UL (ref 3.6–11)

## 2023-02-07 PROCEDURE — 74011250636 HC RX REV CODE- 250/636: Performed by: EMERGENCY MEDICINE

## 2023-02-07 PROCEDURE — 99284 EMERGENCY DEPT VISIT MOD MDM: CPT

## 2023-02-07 PROCEDURE — 96372 THER/PROPH/DIAG INJ SC/IM: CPT

## 2023-02-07 PROCEDURE — 85025 COMPLETE CBC W/AUTO DIFF WBC: CPT

## 2023-02-07 PROCEDURE — 36415 COLL VENOUS BLD VENIPUNCTURE: CPT

## 2023-02-07 PROCEDURE — 86900 BLOOD TYPING SEROLOGIC ABO: CPT

## 2023-02-07 PROCEDURE — 75810000275 HC EMERGENCY DEPT VISIT NO LEVEL OF CARE

## 2023-02-07 PROCEDURE — 96374 THER/PROPH/DIAG INJ IV PUSH: CPT

## 2023-02-07 PROCEDURE — 84702 CHORIONIC GONADOTROPIN TEST: CPT

## 2023-02-07 PROCEDURE — 76817 TRANSVAGINAL US OBSTETRIC: CPT

## 2023-02-07 PROCEDURE — 74011250637 HC RX REV CODE- 250/637: Performed by: EMERGENCY MEDICINE

## 2023-02-07 PROCEDURE — 80053 COMPREHEN METABOLIC PANEL: CPT

## 2023-02-07 RX ORDER — HYDROCODONE BITARTRATE AND ACETAMINOPHEN 5; 325 MG/1; MG/1
1 TABLET ORAL
Status: COMPLETED | OUTPATIENT
Start: 2023-02-07 | End: 2023-02-07

## 2023-02-07 RX ORDER — METHOTREXATE 25 MG/ML
100 INJECTION, SOLUTION INTRA-ARTERIAL; INTRAMUSCULAR; INTRAVENOUS ONCE
Status: COMPLETED | OUTPATIENT
Start: 2023-02-07 | End: 2023-02-07

## 2023-02-07 RX ORDER — MORPHINE SULFATE 4 MG/ML
4 INJECTION INTRAVENOUS ONCE
Status: COMPLETED | OUTPATIENT
Start: 2023-02-07 | End: 2023-02-07

## 2023-02-07 RX ADMIN — MORPHINE SULFATE 4 MG: 4 INJECTION, SOLUTION INTRAMUSCULAR; INTRAVENOUS at 16:41

## 2023-02-07 RX ADMIN — METHOTREXATE 100 MG: 25 INJECTION, SOLUTION INTRA-ARTERIAL; INTRAMUSCULAR; INTRAVENOUS at 20:01

## 2023-02-07 RX ADMIN — HYDROCODONE BITARTRATE AND ACETAMINOPHEN 1 TABLET: 5; 325 TABLET ORAL at 18:06

## 2023-02-07 RX ADMIN — HUMAN RHO(D) IMMUNE GLOBULIN 0.15 MG: 300 INJECTION, SOLUTION INTRAMUSCULAR at 19:38

## 2023-02-07 NOTE — PROGRESS NOTES
Daphnie Casiano is a 30 y.o. female, , Patient's last menstrual period was 2023., who presents today for the following:  Chief Complaint   Patient presents with    Follow-up     Missed ab        No Known Allergies    Current Outpatient Medications   Medication Sig    prenatal vit no.130-iron-folic 27 mg iron- 117 mcg tab tablet Take 1 Tablet by mouth daily. (Patient not taking: Reported on 2023)    naproxen (NAPROSYN) 500 mg tablet Take 1 Tablet by mouth every twelve (12) hours as needed for Pain. (Patient not taking: Reported on 2023)    ondansetron (Zofran ODT) 4 mg disintegrating tablet Take 1 Tablet by mouth every eight (8) hours as needed for Nausea, Vomiting or Nausea or Vomiting. (Patient not taking: Reported on 2023)     No current facility-administered medications for this visit.        Past Medical History:   Diagnosis Date    Anemia     Anxiety     Depression     Fallopian tube disorder     Fibroid     Miscarriage        Past Surgical History:   Procedure Laterality Date    HX ORTHOPAEDIC Left 2007    knee       Family History   Problem Relation Age of Onset    Stroke Mother     Stroke Sister     Colon Cancer Maternal Grandmother        Social History     Socioeconomic History    Marital status: LEGALLY      Spouse name: Not on file    Number of children: Not on file    Years of education: Not on file    Highest education level: Not on file   Occupational History    Not on file   Tobacco Use    Smoking status: Former     Years: 10.00     Types: Cigarettes    Smokeless tobacco: Never    Tobacco comments:     smokes black and milds   Vaping Use    Vaping Use: Former    Substances: Flavoring    Devices: Disposable   Substance and Sexual Activity    Alcohol use: Not Currently     Comment: occasional    Drug use: Never    Sexual activity: Yes     Partners: Male     Birth control/protection: None   Other Topics Concern    Not on file Social History Narrative    Not on file     Social Determinants of Health     Financial Resource Strain: Not on file   Food Insecurity: Not on file   Transportation Needs: Not on file   Physical Activity: Not on file   Stress: Not on file   Social Connections: Not on file   Intimate Partner Violence: Not on file   Housing Stability: Not on file         Follow-up  Patient presents for follow up. Seen in the [facility] week prior on several occassions secondary to vaginal bleeding  Serial hcg reveal low hcg levels < 500 with no IUP seen   On ER evaluation- patient found to be actively bleeding, no abdominal pain  Impression at time incomplete ab with scheduled follow up today  Currently reports some suprapubic pain right LLQ   Bleeding scant    Repeat  hcg from yesterday reveals an increased hcg level > 900  Ultrasound today does not reveal IUP    Review of Systems   Constitutional: Negative. Respiratory: Negative. Cardiovascular: Negative. Gastrointestinal: Negative. Genitourinary: Negative. Musculoskeletal: Negative. Skin: Negative. Neurological: Negative. Endo/Heme/Allergies: Negative. Psychiatric/Behavioral: Negative. All other systems reviewed and are negative. /86 (BP 1 Location: Left upper arm, BP Patient Position: Sitting)   Pulse 70   Resp 16   Ht 5' 2\" (1.575 m)   Wt 201 lb (91.2 kg)   LMP 01/17/2023   SpO2 100%   BMI 36.76 kg/m²    OBGyn Exam   PE:  Constitutional: General Appearance: healthy-appearing, well-nourished, well-developed, and well groomed. Psychiatric: Orientation: to time, place, and person. Mood and Affect: normal mood and affect and appropriate and active and alert. Abdomen: Auscultation/Inspection/Palpation: suprapubic tenderness and mass and non-distended. Hernia: none palpated.      Discussed with patient in light of increasing hcg- although inappropriate rise and not a \"suggested \" level to see intrauterine pregnancy concern for ectopic   Discuss need for repeat hcg stat today  Office us images sent to radiology stat for interpretation  Discussed with patient if  ectopic - management discussed. All questions answered  States comprehension      1.  Inappropriate change in quantitative hCG in early pregnancy    - REFERRAL TO EMERGENCY DEPARTMENT  - REFERRAL TO EMERGENCY DEPARTMENT  - BETA HCG, QT; Future

## 2023-02-07 NOTE — TELEPHONE ENCOUNTER
Spoke with patient and advised her that the images that were sent over to radiologist confirmed that she has a left ectopic pregnancy and that she needs to go to [facility] in [place]. Patient agreed to do so.

## 2023-02-07 NOTE — TELEPHONE ENCOUNTER
Spoke with the patient who is currently at the hospital.  She was seen by Dr Alix Palmer today and advised to go to the ER for a stat HCG and reevaluation for a possible ectopic. Patient states she will only have the lab drawn and will not see a physician or possibly have another ultrasound. Information forwarded to Dr Alix Palmer.

## 2023-02-07 NOTE — ED PROVIDER NOTES
[facility] EMERGENCY DEPT  EMERGENCY DEPARTMENT HISTORY AND PHYSICAL EXAM      Date: 2/7/2023  Patient Name: Josue Shah  MRN: [id number]  YOB: 1985  Date of evaluation: 2/7/2023  Provider: Nate Kay MD   Note Started: 4:07 PM 2/7/23    HISTORY OF PRESENT ILLNESS     Chief Complaint   Patient presents with    Vaginal Bleeding       History Provided By: Patient    HPI: Josue Shah, 30 y.o. female G2, P0 Presenting with abdominal pain, vaginal bleeding. Patient was sent in by her OB/GYN Dr. Rossy Shearer. Patient had hCG quant of 422 on 1/28. This was trended and increased to 913 on last test on 2/1/2023. Her last ultrasound on that date showed left adnexal hypoechoic vascular lesion-cannot exclude ruptured corpus luteum cyst versus ectopic pregnancy. Patient continues to have mild lower abdominal pain and vaginal bleeding. She reports dime sized clots of blood. She was seen by Dr. Rossy Shearer today. Recommended follow-up in ER for repeat hCG  Judn received cytotec on 1/29.    She received rhogam 1/29    PAST MEDICAL HISTORY   Past Medical History:  Past Medical History:   Diagnosis Date    Anemia     Anxiety     Depression     Fallopian tube disorder     Fibroid     Miscarriage        Past Surgical History:  Past Surgical History:   Procedure Laterality Date    HX ORTHOPAEDIC Left 2007    knee       Family History:  Family History   Problem Relation Age of Onset    Stroke Mother     Stroke Sister     Colon Cancer Maternal Grandmother        Social History:  Social History     Tobacco Use    Smoking status: Former     Years: 10.00     Types: Cigarettes    Smokeless tobacco: Never    Tobacco comments:     smokes black and milds   Vaping Use    Vaping Use: Former    Substances: Flavoring    Devices: Disposable   Substance Use Topics    Alcohol use: Not Currently     Comment: occasional    Drug use: Never       Allergies:  No Known Allergies    PCP: Noah Luevano MD    Current Meds:   Discharge Medication List as of 2/7/2023  8:29 PM        CONTINUE these medications which have NOT CHANGED    Details   prenatal vit no.130-iron-folic 27 mg iron- 292 mcg tab tablet Take 1 Tablet by mouth daily. , Normal, Disp-30 Tablet, R-1      naproxen (NAPROSYN) 500 mg tablet Take 1 Tablet by mouth every twelve (12) hours as needed for Pain., Normal, Disp-20 Tablet, R-0      ondansetron (Zofran ODT) 4 mg disintegrating tablet Take 1 Tablet by mouth every eight (8) hours as needed for Nausea, Vomiting or Nausea or Vomiting., Normal, Disp-12 Tablet, R-0             REVIEW OF SYSTEMS   Review of Systems  Positives and Pertinent negatives as per HPI. PHYSICAL EXAM     ED Triage Vitals [02/07/23 1505]   ED Encounter Vitals Group      /89      Pulse (Heart Rate) 90      Resp Rate 19      Temp 98.4 °F (36.9 °C)      Temp src       O2 Sat (%) 100 %      Weight 201 lb      Height 5' 8\"      Physical Exam  Vitals and nursing note reviewed. Constitutional:       General: She is not in acute distress. Appearance: Normal appearance. HENT:      Head: Normocephalic and atraumatic. Mouth/Throat:      Mouth: Mucous membranes are moist.   Eyes:      Extraocular Movements: Extraocular movements intact. Conjunctiva/sclera: Conjunctivae normal.   Cardiovascular:      Rate and Rhythm: Normal rate and regular rhythm. Pulmonary:      Effort: Pulmonary effort is normal. No respiratory distress. Breath sounds: Normal breath sounds. No wheezing, rhonchi or rales. Abdominal:      General: There is no distension. Palpations: Abdomen is soft. Tenderness: There is no abdominal tenderness. Musculoskeletal:         General: Normal range of motion. Cervical back: Normal range of motion. Skin:     General: Skin is warm and dry. Neurological:      General: No focal deficit present. Mental Status: She is alert and oriented to person, place, and time. Mental status is at baseline. SCREENINGS               No data recorded        LAB, EKG AND DIAGNOSTIC RESULTS   Labs:  Recent Results (from the past 12 hour(s))   BETA HCG, QT    Collection Time: 02/07/23  2:11 PM   Result Value Ref Range    HCG, beta, QT 1,071 mIU/mL   CBC WITH AUTOMATED DIFF    Collection Time: 02/07/23  3:41 PM   Result Value Ref Range    WBC 5.8 3.6 - 11.0 K/uL    RBC 4.16 3.80 - 5.20 M/uL    HGB 11.1 (L) 11.5 - 16.0 g/dL    HCT 33.8 (L) 35.0 - 47.0 %    MCV 81.3 80.0 - 99.0 FL    MCH 26.7 26.0 - 34.0 PG    MCHC 32.8 30.0 - 36.5 g/dL    RDW 15.2 (H) 11.5 - 14.5 %    PLATELET 001 443 - 211 K/uL    MPV 11.3 8.9 - 12.9 FL    NRBC 0.0 0.0  WBC    ABSOLUTE NRBC 0.00 0.00 - 0.01 K/uL    NEUTROPHILS 46 32 - 75 %    LYMPHOCYTES 43 12 - 49 %    MONOCYTES 6 5 - 13 %    EOSINOPHILS 4 0 - 7 %    BASOPHILS 1 0 - 1 %    IMMATURE GRANULOCYTES 0 0 - 0.5 %    ABS. NEUTROPHILS 2.7 1.8 - 8.0 K/UL    ABS. LYMPHOCYTES 2.5 0.8 - 3.5 K/UL    ABS. MONOCYTES 0.4 0.0 - 1.0 K/UL    ABS. EOSINOPHILS 0.2 0.0 - 0.4 K/UL    ABS. BASOPHILS 0.0 0.0 - 0.1 K/UL    ABS. IMM. GRANS. 0.0 0.00 - 0.04 K/UL    DF AUTOMATED     METABOLIC PANEL, COMPREHENSIVE    Collection Time: 02/07/23  3:41 PM   Result Value Ref Range    Sodium 138 136 - 145 mmol/L    Potassium 3.6 3.5 - 5.1 mmol/L    Chloride 109 (H) 97 - 108 mmol/L    CO2 26 21 - 32 mmol/L    Anion gap 3 (L) 5 - 15 mmol/L    Glucose 102 (H) 65 - 100 mg/dL    BUN 8 6 - 20 mg/dL    Creatinine 0.80 0.55 - 1.02 mg/dL    BUN/Creatinine ratio 10 (L) 12 - 20      eGFR >60 >60 ml/min/1.73m2    Calcium 9.2 8.5 - 10.1 mg/dL    Bilirubin, total 0.2 0.2 - 1.0 mg/dL    AST (SGOT) 22 15 - 37 U/L    ALT (SGPT) 31 12 - 78 U/L    Alk.  phosphatase 79 45 - 117 U/L    Protein, total 8.2 6.4 - 8.2 g/dL    Albumin 3.6 3.5 - 5.0 g/dL    Globulin 4.6 (H) 2.0 - 4.0 g/dL    A-G Ratio 0.8 (L) 1.1 - 2.2     BETA HCG, QT    Collection Time: 02/07/23  3:41 PM   Result Value Ref Range    Beta HCG, .0 (H) 0 - 6 mIU/mL BLOOD TYPE, (ABO+RH)    Collection Time: 23  3:41 PM   Result Value Ref Range    ABO/Rh(D) O Negative    RH IMMUNE GLOBULIN PROPHYLACTIC    Collection Time: 23  7:45 PM   Result Value Ref Range    No. of weeks gestation 4      Unit number [id number]     Blood component type RH IMMUNE GLOBULIN     Unit division 00     Status of unit [address] to transfuse            Radiologic Studies:  Non-plain film images such as CT, Ultrasound and MRI are read by the radiologist. Plain radiographic images are visualized and preliminarily interpreted by the ED Provider with the below findings:      Interpretation per the Radiologist below, if available at the time of this note:  US TRANSVAGINAL    Result Date: 2023  EXAM:  US TRANSVAGINAL INDICATION: Dx: Missed  [O02.1 (ICD-10-CM)] COMPARISON: Transvaginal ultrasound 2023, CT abdomen and pelvis 10/28/2021 TECHNIQUE: Endovaginal ultrasound of the female pelvis. FINDINGS: Urinary bladder: Nondistended. Uterus: measures 9.2 cm x 9.1 cm x 6.0 cm. 5.2 cm x 4.7 cm x 4.6 cm anterior and 5.0 cm x 4.2 cm x 3.8 cm posterior fibroids. Endometrium: 8 mm in thickness. Homogeneous. Hypervascular. Right ovary: 2.9 cm x 2.0 cm x 2.1 cm. Blood flow is present in the right ovary. No right adnexal mass or cyst. Left ovary: 2.9 cm x 2.0 cm x 1.4 cm. Blood flow is present in the left ovary. An approximately 1.8 cm x 1.2 cm hypoechoic focus in the left adnexal region with possible minimal internal vascularity. Free fluid: Small volume complex free fluid in left adnexa. 1.  Left adnexal hypoechoic possibly minimally vascular lesion and small volume complex left adnexal fluid. This is poorly characterized due to its depth, but cannot exclude a ruptured corpus luteum cyst versus ectopic pregnancy given the provided clinical indication. 2.  Myomatous uterus.  A message for callback was left with the office of Gatito Salinas MD at 1116 hours on 2/7/2023. US UTS TRANSVAGINAL OB    Result Date: 2/7/2023  CLINICAL HISTORY: Evaluate for ectopic pregnancy Comparison to ultrasound performed earlier the same day Realtime sonographic imaging of the pelvis was performed transvaginally. The uterus measures 10.0 x 5.7 x 6.8 cm. 3.8 cm fibroid is stable. The endometrial stripe measures 8 mm. An intrauterine pregnancy is not visualized. The right ovary measures 2.4 x 2.0 x 2.0 cm and the left ovary measures 2.7 x 1.8 x 1.6 cm. There is a 12 x 18 x 13 mm heterogeneous soft tissue nodule adjacent to or arising from the right ovary unchanged compared to the earlier ultrasound. 13 mm cyst is noted in the left ovary, unchanged as well. No free fluid. 18 mm heterogeneous soft tissue nodule adjacent to or arising from the right ovary is unchanged compared to the prior examination and remains of indeterminate etiology. 13 mm cyst in the left ovary is stable. No intrauterine pregnancy is identified.             ED COURSE and DIFFERENTIAL DIAGNOSIS/MDM   Vitals:    Vitals:    02/07/23 1505 02/07/23 2045   BP: 139/89 126/85   Pulse: 90 72   Resp: 19 17   Temp: 98.4 °F (36.9 °C) 98.3 °F (36.8 °C)   SpO2: 100% 100%   Weight: 91.2 kg (201 lb)    Height: 5' 8\" (1.727 m)         Patient was given the following medications:  Medications   rho d immune globulin (RHOPHYLAC) 1,500 unit (300 mcg)/2 mL injection 150 mcg ( IntraVENous Canceled Entry 2/7/23 2029)   morphine injection 4 mg (4 mg IntraVENous Given 2/7/23 1641)   methotrexate chemo syringe 100 mg (100 mg IntraMUSCular Given 2/7/23 2001)   HYDROcodone-acetaminophen (NORCO) 5-325 mg per tablet 1 Tablet (1 Tablet Oral Given 2/7/23 1806)   rho D immune globulin (RHOGAM) 1,500 unit (300 mcg) injection 0.15 mg (0.15 mg IntraMUSCular Given 2/7/23 1938)       CONSULTS: (Who and What was discussed)  None     Chronic Conditions: see hpi     Records Reviewed (source and summary of external notes): Prior medical records, Previous Radiology studies, and Previous Laboratory studies    CC/HPI Summary, DDx, ED Course, and Reassessment: 30 y.o. female G2, P0 Presenting with abdominal pain, vaginal bleeding. Sent from OB/GYN's office for further evaluation. Beta-hCG has been trending up. Ultrasound shows heterogenous adnexal soft tissue nodule concerning for ectopic. Case was discussed with OB/GYN on-call who recommended methotrexate. We will also redose her RhoGAM.  Patient vies to follow-up in 4 days for repeat of her hCG levels. ED Course as of 02/07/23 2059 Tue Feb 07, 2023   1627 Beta HCG, QT(!): 900.0 [KK]   1703 Dr Blaise Hayes paged [OY]   1794 Poke with dr Sindy wilkesdds methotrexate and repeat hcg in 4 days [KK]   5403 Dr Marci Graff repaged [facility] Spoke with Dr Blaise Hayes who recommends redose of rhogam at 150 mcg [KK]      ED Course User Index  [KK] Lacho Linda MD          FINAL IMPRESSION     1. Left tubal pregnancy without intrauterine pregnancy          DISPOSITION/PLAN   Discharged    Discharge Note: The patient is stable for discharge home. The signs, symptoms, diagnosis, and discharge instructions have been discussed, understanding conveyed, and agreed upon. The patient is to follow up as recommended or return to ER should their symptoms worsen.       PATIENT REFERRED TO:  Follow-up Information       Follow up With Specialties Details Why Contact Info    Jamin Sheikh MD Obstetrics & Gynecology  for repeat hcg and reevaluation [address] [place] [address]  [phone] [address] [facility] EMERGENCY DEPT Emergency Medicine In 4 days for repeat blood work if unable to get in with your obgyn [address] [facility]:  Discharge Medication List as of 2/7/2023  8:29 PM            DISCONTINUED MEDICATIONS:  Discharge Medication List as of 2/7/2023  8:29 PM          I am the Primary Clinician of Record: Kyle Grijalva MD (electronically signed)    (Please note that parts of this dictation were completed with voice recognition software. Quite often unanticipated grammatical, syntax, homophones, and other interpretive errors are inadvertently transcribed by the computer software. Please disregards these errors.  Please excuse any errors that have escaped final proofreading.)

## 2023-02-08 LAB
BLD PROD TYP BPU: NORMAL
BPU ID: NORMAL
GA (WEEKS): 4
STATUS OF UNIT,%ST: NORMAL
TRANSFUSION STATUS PATIENT QL: NORMAL
UNIT DIVISION, %UDIV: 0

## 2023-02-08 NOTE — DISCHARGE INSTRUCTIONS
Thank you! Thank you for allowing me to care for you in the emergency department. It is my goal to provide you with excellent care. If you have not received excellent quality care, please ask to speak to the nurse manager. Please fill out the survey that will come to you by mail or email since we listen to your feedback! Below you will find a list of your tests from today's visit. Should you have any questions, please do not hesitate to call the emergency department. Labs  Recent Results (from the past 12 hour(s))   BETA HCG, QT    Collection Time: 02/07/23  2:11 PM   Result Value Ref Range    HCG, beta, QT 1,071 mIU/mL   CBC WITH AUTOMATED DIFF    Collection Time: 02/07/23  3:41 PM   Result Value Ref Range    WBC 5.8 3.6 - 11.0 K/uL    RBC 4.16 3.80 - 5.20 M/uL    HGB 11.1 (L) 11.5 - 16.0 g/dL    HCT 33.8 (L) 35.0 - 47.0 %    MCV 81.3 80.0 - 99.0 FL    MCH 26.7 26.0 - 34.0 PG    MCHC 32.8 30.0 - 36.5 g/dL    RDW 15.2 (H) 11.5 - 14.5 %    PLATELET 253 887 - 347 K/uL    MPV 11.3 8.9 - 12.9 FL    NRBC 0.0 0.0  WBC    ABSOLUTE NRBC 0.00 0.00 - 0.01 K/uL    NEUTROPHILS 46 32 - 75 %    LYMPHOCYTES 43 12 - 49 %    MONOCYTES 6 5 - 13 %    EOSINOPHILS 4 0 - 7 %    BASOPHILS 1 0 - 1 %    IMMATURE GRANULOCYTES 0 0 - 0.5 %    ABS. NEUTROPHILS 2.7 1.8 - 8.0 K/UL    ABS. LYMPHOCYTES 2.5 0.8 - 3.5 K/UL    ABS. MONOCYTES 0.4 0.0 - 1.0 K/UL    ABS. EOSINOPHILS 0.2 0.0 - 0.4 K/UL    ABS. BASOPHILS 0.0 0.0 - 0.1 K/UL    ABS. IMM.  GRANS. 0.0 0.00 - 0.04 K/UL    DF AUTOMATED     METABOLIC PANEL, COMPREHENSIVE    Collection Time: 02/07/23  3:41 PM   Result Value Ref Range    Sodium 138 136 - 145 mmol/L    Potassium 3.6 3.5 - 5.1 mmol/L    Chloride 109 (H) 97 - 108 mmol/L    CO2 26 21 - 32 mmol/L    Anion gap 3 (L) 5 - 15 mmol/L    Glucose 102 (H) 65 - 100 mg/dL    BUN 8 6 - 20 mg/dL    Creatinine 0.80 0.55 - 1.02 mg/dL    BUN/Creatinine ratio 10 (L) 12 - 20      eGFR >60 >60 ml/min/1.73m2    Calcium 9.2 8.5 - 10.1 mg/dL Bilirubin, total 0.2 0.2 - 1.0 mg/dL    AST (SGOT) 22 15 - 37 U/L    ALT (SGPT) 31 12 - 78 U/L    Alk. phosphatase 79 45 - 117 U/L    Protein, total 8.2 6.4 - 8.2 g/dL    Albumin 3.6 3.5 - 5.0 g/dL    Globulin 4.6 (H) 2.0 - 4.0 g/dL    A-G Ratio 0.8 (L) 1.1 - 2.2     BETA HCG, QT    Collection Time: 02/07/23  3:41 PM   Result Value Ref Range    Beta HCG, .0 (H) 0 - 6 mIU/mL   BLOOD TYPE, (ABO+RH)    Collection Time: 02/07/23  3:41 PM   Result Value Ref Range    ABO/Rh(D) O Negative    RH IMMUNE GLOBULIN PROPHYLACTIC    Collection Time: 02/07/23  7:45 PM   Result Value Ref Range    No. of weeks gestation 4      Unit number [id number]     Blood component type RH IMMUNE GLOBULIN     Unit division 00     Status of unit Allocated     TRANSFUSION STATUS Ok to transfuse        Radiologic Studies  US UTS TRANSVAGINAL OB   Final Result   18 mm heterogeneous soft tissue nodule adjacent to or arising from   the right ovary is unchanged compared to the prior examination and remains of   indeterminate etiology. 13 mm cyst in the left ovary is stable. No intrauterine   pregnancy is identified. CT Results  (Last 48 hours)      None          CXR Results  (Last 48 hours)      None          ------------------------------------------------------------------------------------------------------------  The exam and treatment you received in the Emergency Department were for an urgent problem and are not intended as complete care. It is important that you follow-up with a doctor, nurse practitioner, or physician assistant to:  (1) confirm your diagnosis,  (2) re-evaluation of changes in your illness and treatment, and  (3) for ongoing care. Please take your discharge instructions with you when you go to your follow-up appointment. If you have any problem arranging a follow-up appointment, contact the Emergency Department.   If your symptoms become worse or you do not improve as expected and you are unable to reach your health care provider, please return to the Emergency Department. We are available 24 hours a day. If a prescription has been provided, please have it filled as soon as possible to prevent a delay in treatment. If you have any questions or reservations about taking the medication due to side effects or interactions with other medications, please call your primary care provider or contact the ER.

## 2023-02-09 ENCOUNTER — TELEPHONE (OUTPATIENT)
Dept: OBGYN CLINIC | Age: 38
End: 2023-02-09

## 2023-02-10 DIAGNOSIS — O00.90 ECTOPIC FETUS: Primary | ICD-10-CM

## 2023-02-11 LAB — HCG INTACT+B SERPL-ACNC: 1063 MIU/ML

## 2023-02-13 ENCOUNTER — TELEPHONE (OUTPATIENT)
Dept: OBGYN CLINIC | Age: 38
End: 2023-02-13

## 2023-02-13 DIAGNOSIS — O00.90 ECTOPIC PREGNANCY, UNSPECIFIED LOCATION, UNSPECIFIED WHETHER INTRAUTERINE PREGNANCY PRESENT: Primary | ICD-10-CM

## 2023-02-15 ENCOUNTER — PATIENT MESSAGE (OUTPATIENT)
Dept: OBGYN CLINIC | Age: 38
End: 2023-02-15

## 2023-02-16 DIAGNOSIS — O20.0 MISCARRIAGE, THREATENED, EARLY PREGNANCY: Primary | ICD-10-CM

## 2023-02-18 LAB — HCG INTACT+B SERPL-ACNC: 438 MIU/ML

## 2023-02-21 DIAGNOSIS — O03.9 MISCARRIAGE: Primary | ICD-10-CM

## 2023-02-25 LAB — HCG INTACT+B SERPL-ACNC: 133 MIU/ML

## 2023-05-01 ENCOUNTER — OFFICE VISIT (OUTPATIENT)
Dept: OBGYN CLINIC | Age: 38
End: 2023-05-01
Payer: COMMERCIAL

## 2023-05-01 VITALS — WEIGHT: 201 LBS | BODY MASS INDEX: 30.46 KG/M2 | HEIGHT: 68 IN

## 2023-05-01 DIAGNOSIS — N92.6 IRREGULAR MENSES: Primary | ICD-10-CM

## 2023-05-01 DIAGNOSIS — N92.0 MENORRHAGIA WITH REGULAR CYCLE: ICD-10-CM

## 2023-05-01 PROCEDURE — 99213 OFFICE O/P EST LOW 20 MIN: CPT | Performed by: OBSTETRICS & GYNECOLOGY

## 2023-05-01 NOTE — PROGRESS NOTES
Jaycee Wang is a 30 y.o. female, , Patient's last menstrual period was 2023., who presents today for the following:  Chief Complaint   Patient presents with    Follow-up        No Known Allergies    No current outpatient medications on file. No current facility-administered medications for this visit.        Past Medical History:   Diagnosis Date    Anemia     Anxiety     Depression     Fallopian tube disorder     Fibroid     Miscarriage        Past Surgical History:   Procedure Laterality Date    HX ORTHOPAEDIC Left 2007    knee       Family History   Problem Relation Age of Onset    Stroke Mother     Stroke Sister     Colon Cancer Maternal Grandmother        Social History     Socioeconomic History    Marital status: LEGALLY      Spouse name: Not on file    Number of children: Not on file    Years of education: Not on file    Highest education level: Not on file   Occupational History    Not on file   Tobacco Use    Smoking status: Former     Years: 10.     Types: Cigarettes    Smokeless tobacco: Never    Tobacco comments:     smokes black and milds   Vaping Use    Vaping Use: Former    Substances: Flavoring    Devices: Disposable   Substance and Sexual Activity    Alcohol use: Not Currently     Comment: occasional    Drug use: Never    Sexual activity: Yes     Partners: Male     Birth control/protection: None   Other Topics Concern    Not on file   Social History Narrative    Not on file     Social Determinants of Health     Financial Resource Strain: Not on file   Food Insecurity: Not on file   Transportation Needs: Not on file   Physical Activity: Not on file   Stress: Not on file   Social Connections: Not on file   Intimate Partner Violence: Not on file   Housing Stability: Not on file         Follow-up  Patient presents for follow up  Abnormal Bleeding / irregular menstrual cycles since miscarriage   Duration: 7-10 days/month   Quality: heavy; passing clots   Severity:  requires double protection; interferes with daily activities; Context: history of fibroid  Associated Symptoms: no abdominal pain; no dyspareunia; no fatigue; no dizziness; no anemia/iron supplements; no shortness of breath; no chest pain; no palpations; no bloating; no change in bowel function; no urinary symptoms; no PMS; no vaginal discharge; no vaginal itching/irritation; dysmenorrhea; pelvic pain       Review of Systems   Constitutional: Negative. Respiratory: Negative. Cardiovascular: Negative. Gastrointestinal: Negative. Genitourinary: Negative. Musculoskeletal: Negative. Skin: Negative. Neurological: Negative. Endo/Heme/Allergies: Negative. Psychiatric/Behavioral: Negative. All other systems reviewed and are negative. Ht 5' 8\" (1.727 m)   Wt 201 lb (91.2 kg)   LMP 01/17/2023   Breastfeeding Unknown   BMI 30.56 kg/m²    OBGyn Exam   Constitutional: General Appearance: healthy-appearing, well-nourished, and well-developed. Skin: Appearance: no rashes or lesions. Neck: Neck: supple, FROM, trachea midline, and no masses. Thyroid: no enlargement or nodules and non-tender. Lungs: Respiratory Effort: no intercostal retractions or accessory muscle usage. Cardiovascular: Peripheral Vascular: no varicosities, LLE edema, RLE edema, calf tenderness, or palpable cords and pedal pulses intact. Abdomen: Auscultation/Inspection/Palpation: no tenderness, hepatomegaly, splenomegaly, masses, or CVA tenderness and soft and non-distended. Hernia: none palpated. Female Genitalia:  patient declines pelvic exam; on menstrual cycle. 1. Irregular menses    - US PELV NON OBS; Future  - TSH AND FREE T4  - PROLACTIN  - [facility]  - VON WILLEBRAND PANEL  - ANTI-MULLERIAN HORMONE ([facility])    2.  Menorrhagia with regular cycle    - US PELV NON OBS; Future  - TSH AND FREE T4  - PROLACTIN  - [facility]  - VON WILLEBRAND PANEL  - ANTI-MULLERIAN HORMONE ([facility])

## 2023-05-10 ENCOUNTER — TELEPHONE (OUTPATIENT)
Age: 38
End: 2023-05-10

## 2023-05-10 NOTE — TELEPHONE ENCOUNTER
Spoke with patient advised that labs normal provider reviewed and were normal.  No reason as to why she is experiencing bleeding. Patient requesting follow-up appt to discuss fertility. Appt scheduled. Advised that she will probably need to see fertility provider to see options.

## 2023-05-12 ENCOUNTER — TRANSCRIBE ORDERS (OUTPATIENT)
Facility: HOSPITAL | Age: 38
End: 2023-05-12

## 2023-05-12 DIAGNOSIS — N92.0 MENORRHAGIA WITH REGULAR CYCLE: ICD-10-CM

## 2023-05-12 DIAGNOSIS — N92.6 MENSES, IRREGULAR: Primary | ICD-10-CM

## 2023-06-09 DIAGNOSIS — N92.0 MENORRHAGIA WITH REGULAR CYCLE: ICD-10-CM

## 2023-06-09 DIAGNOSIS — N92.6 MENSES, IRREGULAR: ICD-10-CM

## 2023-06-14 ENCOUNTER — TELEPHONE (OUTPATIENT)
Age: 38
End: 2023-06-14

## 2023-06-14 ENCOUNTER — OFFICE VISIT (OUTPATIENT)
Age: 38
End: 2023-06-14
Payer: COMMERCIAL

## 2023-06-14 VITALS
SYSTOLIC BLOOD PRESSURE: 136 MMHG | WEIGHT: 203 LBS | DIASTOLIC BLOOD PRESSURE: 96 MMHG | BODY MASS INDEX: 30.77 KG/M2 | OXYGEN SATURATION: 98 % | HEIGHT: 68 IN | HEART RATE: 71 BPM

## 2023-06-14 DIAGNOSIS — N92.0 EXCESSIVE AND FREQUENT MENSTRUATION WITH REGULAR CYCLE: Primary | ICD-10-CM

## 2023-06-14 DIAGNOSIS — D21.9 FIBROID: ICD-10-CM

## 2023-06-14 PROCEDURE — 99213 OFFICE O/P EST LOW 20 MIN: CPT | Performed by: OBSTETRICS & GYNECOLOGY

## 2023-06-14 NOTE — TELEPHONE ENCOUNTER
I called patient back to let her know that the email for her [facility] paper work did not come through. I left a voicemail message on patient phone to email me back the [facility] paper work.

## 2023-06-23 NOTE — PROGRESS NOTES
Mauricio Paz is a 30 y.o. female, No obstetric history on file., Patient's last menstrual period was 2023., who presents today for the following:  Chief Complaint   Patient presents with    Follow-up        No Known Allergies    Current Outpatient Medications   Medication Sig Dispense Refill    naproxen (NAPROSYN) 500 MG tablet Take by mouth (Patient not taking: Reported on 2023)      ondansetron (ZOFRAN-ODT) 4 MG disintegrating tablet Take by mouth every 8 hours as needed (Patient not taking: Reported on 2023)       No current facility-administered medications for this visit.          Past Medical History:   Diagnosis Date    Anemia     Anxiety     Depression     Fallopian tube disorder     Fibroid     Miscarriage        Past Surgical History:   Procedure Laterality Date    ORTHOPEDIC SURGERY Left     knee       Family History   Problem Relation Age of Onset    Colon Cancer Maternal Grandmother     Stroke Mother     Stroke Sister        Social History     Socioeconomic History    Marital status: Legally      Spouse name: Not on file    Number of children: Not on file    Years of education: Not on file    Highest education level: Not on file   Occupational History    Not on file   Tobacco Use    Smoking status: Former    Smokeless tobacco: Never   Substance and Sexual Activity    Alcohol use: Not Currently    Drug use: Never    Sexual activity: Not on file   Other Topics Concern    Not on file   Social History Narrative    Not on file     Social Determinants of Health     Financial Resource Strain: Not on file   Food Insecurity: Not on file   Transportation Needs: Not on file   Physical Activity: Not on file   Stress: Not on file   Social Connections: Not on file   Intimate Partner Violence: Not on file   Housing Stability: Not on file         HPI  Patient presents for follow    History uterine fibroids with associated heavy menstrual cycles

## 2023-07-06 DIAGNOSIS — N92.6 IRREGULAR MENSTRUAL CYCLE: Primary | ICD-10-CM

## 2023-11-10 ENCOUNTER — TELEPHONE (OUTPATIENT)
Age: 38
End: 2023-11-10

## 2023-11-10 NOTE — TELEPHONE ENCOUNTER
11/10/2023 @8:41am-Called [phone] and L/M on VM to have patient contact the office at [phone] regarding message she sent via portal regarding an U/S and information on steps for fibroids. ww

## 2023-11-15 ENCOUNTER — TELEPHONE (OUTPATIENT)
Age: 38
End: 2023-11-15

## 2023-11-15 NOTE — TELEPHONE ENCOUNTER
Pt called on 11/14 and lvm to reschedule her 7/11 ultrasound appt.  Returned pt's call on 11/15 @ 10:21 am and rescheduled the appt for 11/16 @ 2pm.

## 2023-12-01 ENCOUNTER — TELEPHONE (OUTPATIENT)
Age: 38
End: 2023-12-01

## 2023-12-01 NOTE — TELEPHONE ENCOUNTER
Returned the patient's call and advised her Dr Vincent is out of the office but will return next week.  Patient would like a return call to discuss results.  Message forwarded to Dr Vincent.

## 2023-12-13 ENCOUNTER — TELEPHONE (OUTPATIENT)
Age: 38
End: 2023-12-13

## 2023-12-13 NOTE — TELEPHONE ENCOUNTER
12/13/2023 @8:35am-Called [phone] and L/M on VM to have patient contact the office at [phone] regarding message she sent via portal to schedule an appt. ww

## 2023-12-13 NOTE — TELEPHONE ENCOUNTER
Left a message asking the patient to return my call.  Per Dr Vincent, the patient's ultrasound showed 2 fibroids but they shouldn't be the cause of her menstrual problem.  She would like the patient to schedule an appointment for an EMB.

## 2023-12-28 ENCOUNTER — HOSPITAL ENCOUNTER (EMERGENCY)
Facility: HOSPITAL | Age: 38
Discharge: HOME | End: 2023-12-28
Payer: COMMERCIAL

## 2023-12-28 VITALS
BODY MASS INDEX: 30.77 KG/M2 | HEIGHT: 68 IN | TEMPERATURE: 98 F | WEIGHT: 203 LBS | HEART RATE: 72 BPM | DIASTOLIC BLOOD PRESSURE: 77 MMHG | RESPIRATION RATE: 18 BRPM | OXYGEN SATURATION: 99 % | SYSTOLIC BLOOD PRESSURE: 138 MMHG

## 2023-12-28 DIAGNOSIS — N91.2 AMENORRHEA: Primary | ICD-10-CM

## 2023-12-28 LAB
APPEARANCE UR: CLEAR
BACTERIA URNS QL MICRO: NEGATIVE /HPF
BILIRUB UR QL: NEGATIVE
COLOR UR: YELLOW
EPITH CASTS URNS QL MICRO: ABNORMAL /LPF
GLUCOSE UR STRIP.AUTO-MCNC: NEGATIVE MG/DL
HCG UR QL: NEGATIVE
HGB UR QL STRIP: NEGATIVE
KETONES UR QL STRIP.AUTO: NEGATIVE MG/DL
LEUKOCYTE ESTERASE UR QL STRIP.AUTO: NEGATIVE
NITRITE UR QL STRIP.AUTO: NEGATIVE
PH UR STRIP: 5 (ref 5–8)
PROT UR STRIP-MCNC: ABNORMAL MG/DL
RBC #/AREA URNS HPF: ABNORMAL /HPF (ref 0–5)
SP GR UR REFRACTOMETRY: 1.02 (ref 1–1.03)
URINE CULTURE IF INDICATED: ABNORMAL
UROBILINOGEN UR QL STRIP.AUTO: 0.1 EU/DL (ref 0.2–1)
WBC URNS QL MICRO: ABNORMAL /HPF (ref 0–4)

## 2023-12-28 PROCEDURE — 99283 EMERGENCY DEPT VISIT LOW MDM: CPT

## 2023-12-28 PROCEDURE — 81001 URINALYSIS AUTO W/SCOPE: CPT

## 2023-12-28 PROCEDURE — 81025 URINE PREGNANCY TEST: CPT

## 2023-12-28 NOTE — ED TRIAGE NOTES
Pt states LMP 10/19/23. States she has taken pregnancy test at home and were negative. Concerned because she has not gotten her menstrual cycle back.     OB Dr. Arturo Abreu appt on 1/28/23

## 2023-12-29 NOTE — ED PROVIDER NOTES
FINAL IMPRESSION     1. Amenorrhea          DISPOSITION/PLAN   DISPOSITION Decision To Discharge 12/28/2023 08:24:51 PM    Discharge Note: The patient is stable for discharge home. The signs, symptoms, diagnosis, and discharge instructions have been discussed, understanding conveyed, and agreed upon. The patient is to follow up as recommended or return to ER should their symptoms worsen. PATIENT REFERRED TO:  [facility] EMERGENCY DEPARTMENT  [address]  [phone]    If symptoms worsen    Dale Bardales MD  [address]  [phone]    Call in 1 day          DISCHARGE MEDICATIONS:     Medication List        ASK your doctor about these medications      naproxen 500 MG tablet  Commonly known as: NAPROSYN     ondansetron 4 MG disintegrating tablet  Commonly known as: ZOFRAN-ODT                DISCONTINUED MEDICATIONS:  Current Discharge Medication List          I am the Primary Clinician of Record. MICHAEL Howell - NP (electronically signed)    (Please note that parts of this dictation were completed with voice recognition software. Quite often unanticipated grammatical, syntax, homophones, and other interpretive errors are inadvertently transcribed by the computer software. Please disregards these errors.  Please excuse any errors that have escaped final proofreading.)     MICHAEL Howell NP  12/30/23 [id number]

## 2023-12-29 NOTE — DISCHARGE INSTRUCTIONS
Thank you! Thank you for allowing me to care for you in the emergency department. It is my goal to provide you with excellent care. If you have not received excellent quality care, please ask to speak to the nurse manager. Please fill out the survey that will come to you by mail or email since we listen to your feedback! Below you will find a list of your tests from today's visit. Should you have any questions, please do not hesitate to call the emergency department. Labs  Recent Results (from the past 12 hour(s))   Urinalysis with Reflex to Culture    Collection Time: 12/28/23  7:07 PM    Specimen: Urine   Result Value Ref Range    Color, UA Yellow      Appearance Clear Clear      Specific Gravity, UA 1.020 1.003 - 1.030      pH, Urine 5.0 5.0 - 8.0      Protein, UA Trace (A) Negative mg/dL    Glucose, UA Negative Negative mg/dL    Ketones, Urine Negative Negative mg/dL    Bilirubin Urine Negative Negative      Blood, Urine Negative Negative      Urobilinogen, Urine 0.1 (L) 0.2 - 1.0 EU/dL    Nitrite, Urine Negative Negative      Leukocyte Esterase, Urine Negative Negative      WBC, UA 0-4 0 - 4 /hpf    RBC, UA 0-5 0 - 5 /hpf    Epithelial Cells UA Few Few /lpf    BACTERIA, URINE Negative Negative /hpf    Urine Culture if Indicated Culture not indicated by UA result Culture not indicated by UA result     POC Pregnancy Urine Qual    Collection Time: 12/28/23  8:09 PM   Result Value Ref Range    Preg Test, Ur Negative Negative         Radiologic Studies  No orders to display     ------------------------------------------------------------------------------------------------------------  The exam and treatment you received in the Emergency Department were for an urgent problem and are not intended as complete care.  It is important that you follow-up with a doctor, nurse practitioner, or physician assistant to:  (1) confirm your diagnosis,  (2) re-evaluation of changes in your illness and treatment, and

## 2024-01-18 ENCOUNTER — OFFICE VISIT (OUTPATIENT)
Age: 39
End: 2024-01-18
Payer: COMMERCIAL

## 2024-01-18 VITALS
DIASTOLIC BLOOD PRESSURE: 78 MMHG | HEIGHT: 68 IN | RESPIRATION RATE: 18 BRPM | OXYGEN SATURATION: 99 % | WEIGHT: 197 LBS | SYSTOLIC BLOOD PRESSURE: 118 MMHG | HEART RATE: 85 BPM | BODY MASS INDEX: 29.86 KG/M2

## 2024-01-18 DIAGNOSIS — D21.9 FIBROID: Primary | ICD-10-CM

## 2024-01-18 PROCEDURE — 99213 OFFICE O/P EST LOW 20 MIN: CPT | Performed by: OBSTETRICS & GYNECOLOGY

## 2024-01-22 ENCOUNTER — PREP FOR PROCEDURE (OUTPATIENT)
Age: 39
End: 2024-01-22

## 2024-01-22 PROBLEM — D25.9 UTERINE FIBROID: Status: ACTIVE | Noted: 2024-01-22

## 2024-01-29 ENCOUNTER — TELEPHONE (OUTPATIENT)
Age: 39
End: 2024-01-29

## 2024-01-29 NOTE — TELEPHONE ENCOUNTER
Called and spoke with patient to let her know that her surgery is cancelled due to her insurance denying her authorization. Per Gifty the patient and Dr. Vincent will come up with an alternative her situation and go from there.     Surgery scheduling was notified and procedure cancelled.

## 2024-01-31 ENCOUNTER — PREP FOR PROCEDURE (OUTPATIENT)
Age: 39
End: 2024-01-31

## 2024-01-31 PROBLEM — D25.9 FIBROID, UTERINE: Status: ACTIVE | Noted: 2024-01-31

## 2024-01-31 NOTE — PROGRESS NOTES
Adeola Coffman is a 38 y.o. female, No obstetric history on file., Patient's last menstrual period was 12/31/2023., who presents today for the following:  Chief Complaint   Patient presents with   • Amenorrhea     Patient c/o irregular cycles and irritability.  Patient states she suffers from hot flashes.        No Known Allergies    No current outpatient medications on file.     No current facility-administered medications for this visit.         Past Medical History:   Diagnosis Date   • Anemia    • Anxiety    • Depression    • Fallopian tube disorder    • Fibroid    • Miscarriage        Past Surgical History:   Procedure Laterality Date   • ORTHOPEDIC SURGERY Left 2007    knee       Family History   Problem Relation Age of Onset   • Colon Cancer Maternal Grandmother    • Stroke Mother    • Stroke Sister        Social History     Socioeconomic History   • Marital status: Legally      Spouse name: Not on file   • Number of children: Not on file   • Years of education: Not on file   • Highest education level: Not on file   Occupational History   • Not on file   Tobacco Use   • Smoking status: Former   • Smokeless tobacco: Never   Substance and Sexual Activity   • Alcohol use: Not Currently   • Drug use: Never   • Sexual activity: Not on file   Other Topics Concern   • Not on file   Social History Narrative   • Not on file     Social Determinants of Health     Financial Resource Strain: Not on file   Food Insecurity: Not on file   Transportation Needs: Not on file   Physical Activity: Not on file   Stress: Not on file   Social Connections: Not on file   Intimate Partner Violence: Not on file   Housing Stability: Not on file         HPI  Patient presents for follow up  Continues to experience irregular menstrual cycles    History uterine fibroids with associated heavy menstrual cycles   Recent pregnancy loss in OB Hx  Ultrasound that was obtained during pregnancy loss revealed three uterine fibroids

## 2024-02-15 ENCOUNTER — OFFICE VISIT (OUTPATIENT)
Age: 39
End: 2024-02-15
Payer: COMMERCIAL

## 2024-02-15 VITALS — BODY MASS INDEX: 30.17 KG/M2 | WEIGHT: 198.4 LBS | DIASTOLIC BLOOD PRESSURE: 87 MMHG | SYSTOLIC BLOOD PRESSURE: 128 MMHG

## 2024-02-15 DIAGNOSIS — D21.9 FIBROID: Primary | ICD-10-CM

## 2024-02-15 DIAGNOSIS — R03.0 ELEVATED BLOOD PRESSURE READING: ICD-10-CM

## 2024-02-15 PROCEDURE — 99213 OFFICE O/P EST LOW 20 MIN: CPT | Performed by: OBSTETRICS & GYNECOLOGY

## 2024-02-28 ENCOUNTER — HOSPITAL ENCOUNTER (OUTPATIENT)
Facility: HOSPITAL | Age: 39
Discharge: HOME | End: 2024-03-02
Payer: COMMERCIAL

## 2024-02-28 VITALS
SYSTOLIC BLOOD PRESSURE: 123 MMHG | HEIGHT: 63 IN | DIASTOLIC BLOOD PRESSURE: 87 MMHG | BODY MASS INDEX: 35.19 KG/M2 | WEIGHT: 198.6 LBS | OXYGEN SATURATION: 100 % | RESPIRATION RATE: 18 BRPM | HEART RATE: 68 BPM | TEMPERATURE: 98.4 F

## 2024-02-28 LAB
ABO + RH BLD: NORMAL
BLOOD GROUP ANTIBODIES SERPL: NEGATIVE
ERYTHROCYTE [DISTWIDTH] IN BLOOD BY AUTOMATED COUNT: 15.5 % (ref 11.5–14.5)
HCT VFR BLD AUTO: 36.1 % (ref 35–47)
HGB BLD-MCNC: 11.2 G/DL (ref 11.5–16)
MCH RBC QN AUTO: 25.7 PG (ref 26–34)
MCHC RBC AUTO-ENTMCNC: 31 G/DL (ref 30–36.5)
MCV RBC AUTO: 82.8 FL (ref 80–99)
MRSA DNA SPEC QL NAA+PROBE: NOT DETECTED
NRBC # BLD: 0 K/UL (ref 0–0.01)
NRBC BLD-RTO: 0 PER 100 WBC
PLATELET # BLD AUTO: 187 K/UL (ref 150–400)
PMV BLD AUTO: 11.1 FL (ref 8.9–12.9)
RBC # BLD AUTO: 4.36 M/UL (ref 3.8–5.2)
SPECIMEN EXP DATE BLD: NORMAL
WBC # BLD AUTO: 3.8 K/UL (ref 3.6–11)

## 2024-02-28 PROCEDURE — 36415 COLL VENOUS BLD VENIPUNCTURE: CPT

## 2024-02-28 PROCEDURE — 86900 BLOOD TYPING SEROLOGIC ABO: CPT

## 2024-02-28 PROCEDURE — 87641 MR-STAPH DNA AMP PROBE: CPT

## 2024-02-28 PROCEDURE — 86850 RBC ANTIBODY SCREEN: CPT

## 2024-02-28 PROCEDURE — 83036 HEMOGLOBIN GLYCOSYLATED A1C: CPT

## 2024-02-28 PROCEDURE — 85027 COMPLETE CBC AUTOMATED: CPT

## 2024-02-28 PROCEDURE — 86901 BLOOD TYPING SEROLOGIC RH(D): CPT

## 2024-02-29 LAB
EST. AVERAGE GLUCOSE BLD GHB EST-MCNC: 103 MG/DL
HBA1C MFR BLD: 5.2 % (ref 4–5.6)

## 2024-02-29 NOTE — PROGRESS NOTES
Adeola Coffman is a 38 y.o. female, No obstetric history on file., Patient's last menstrual period was 02/02/2024., who presents today for the following:  Chief Complaint   Patient presents with   • Pre-op Exam        No Known Allergies    No current outpatient medications on file.     No current facility-administered medications for this visit.         Past Medical History:   Diagnosis Date   • Anemia    • Anxiety    • Depression    • Fallopian tube disorder    • Fibroid    • Miscarriage        Past Surgical History:   Procedure Laterality Date   • ORTHOPEDIC SURGERY Left 2007    knee bakers cyst   • WISDOM TOOTH EXTRACTION      2015 or 2016       Family History   Problem Relation Age of Onset   • Colon Cancer Maternal Grandmother    • Stroke Mother    • Stroke Sister        Social History     Socioeconomic History   • Marital status: Single     Spouse name: Not on file   • Number of children: Not on file   • Years of education: Not on file   • Highest education level: Not on file   Occupational History   • Not on file   Tobacco Use   • Smoking status: Former   • Smokeless tobacco: Never   Vaping Use   • Vaping Use: Never used   Substance and Sexual Activity   • Alcohol use: Not Currently   • Drug use: Not Currently   • Sexual activity: Yes     Partners: Male     Birth control/protection: None   Other Topics Concern   • Not on file   Social History Narrative   • Not on file     Social Determinants of Health     Financial Resource Strain: Not on file   Food Insecurity: Not on file   Transportation Needs: Not on file   Physical Activity: Not on file   Stress: Not on file   Social Connections: Not on file   Intimate Partner Violence: Not on file   Housing Stability: Not on file         HPI    Patient presents for follow up  Continues to experience irregular menstrual cycles     History uterine fibroids with associated heavy menstrual cycles   Recent pregnancy loss in 2023  Ultrasound that was obtained during

## 2024-03-06 ENCOUNTER — HOSPITAL ENCOUNTER (OUTPATIENT)
Facility: HOSPITAL | Age: 39
Setting detail: OBSERVATION
Discharge: HOME | End: 2024-03-07
Attending: OBSTETRICS & GYNECOLOGY | Admitting: OBSTETRICS & GYNECOLOGY
Payer: COMMERCIAL

## 2024-03-06 ENCOUNTER — ANESTHESIA (OUTPATIENT)
Facility: HOSPITAL | Age: 39
End: 2024-03-06
Payer: COMMERCIAL

## 2024-03-06 ENCOUNTER — ANESTHESIA EVENT (OUTPATIENT)
Facility: HOSPITAL | Age: 39
End: 2024-03-06
Payer: COMMERCIAL

## 2024-03-06 PROBLEM — T36.95XA: Status: ACTIVE | Noted: 2024-03-06

## 2024-03-06 LAB
GLUCOSE BLD STRIP.AUTO-MCNC: 82 MG/DL (ref 65–100)
HCG UR QL: NEGATIVE
PERFORMED BY:: NORMAL

## 2024-03-06 PROCEDURE — 2709999900 HC NON-CHARGEABLE SUPPLY: Performed by: OBSTETRICS & GYNECOLOGY

## 2024-03-06 PROCEDURE — 6360000002 HC RX W HCPCS: Performed by: NURSE ANESTHETIST, CERTIFIED REGISTERED

## 2024-03-06 PROCEDURE — 3600000013 HC SURGERY LEVEL 3 ADDTL 15MIN: Performed by: OBSTETRICS & GYNECOLOGY

## 2024-03-06 PROCEDURE — 7100000001 HC PACU RECOVERY - ADDTL 15 MIN: Performed by: OBSTETRICS & GYNECOLOGY

## 2024-03-06 PROCEDURE — 81025 URINE PREGNANCY TEST: CPT

## 2024-03-06 PROCEDURE — 2580000003 HC RX 258: Performed by: OBSTETRICS & GYNECOLOGY

## 2024-03-06 PROCEDURE — 6360000002 HC RX W HCPCS: Performed by: OBSTETRICS & GYNECOLOGY

## 2024-03-06 PROCEDURE — 6370000000 HC RX 637 (ALT 250 FOR IP): Performed by: OBSTETRICS & GYNECOLOGY

## 2024-03-06 PROCEDURE — 36415 COLL VENOUS BLD VENIPUNCTURE: CPT

## 2024-03-06 PROCEDURE — 3600000003 HC SURGERY LEVEL 3 BASE: Performed by: OBSTETRICS & GYNECOLOGY

## 2024-03-06 PROCEDURE — G0378 HOSPITAL OBSERVATION PER HR: HCPCS

## 2024-03-06 PROCEDURE — 2500000003 HC RX 250 WO HCPCS: Performed by: NURSE ANESTHETIST, CERTIFIED REGISTERED

## 2024-03-06 PROCEDURE — 82962 GLUCOSE BLOOD TEST: CPT

## 2024-03-06 PROCEDURE — 7100000000 HC PACU RECOVERY - FIRST 15 MIN: Performed by: OBSTETRICS & GYNECOLOGY

## 2024-03-06 RX ORDER — MIDAZOLAM HYDROCHLORIDE 1 MG/ML
INJECTION INTRAMUSCULAR; INTRAVENOUS PRN
Status: DISCONTINUED | OUTPATIENT
Start: 2024-03-06 | End: 2024-03-06 | Stop reason: SDUPTHER

## 2024-03-06 RX ORDER — MORPHINE SULFATE/0.9% NACL/PF 1 MG/ML
SYRINGE (ML) INJECTION CONTINUOUS
Status: DISCONTINUED | OUTPATIENT
Start: 2024-03-06 | End: 2024-03-06

## 2024-03-06 RX ORDER — POLYETHYLENE GLYCOL 3350 17 G/17G
17 POWDER, FOR SOLUTION ORAL DAILY PRN
Status: DISCONTINUED | OUTPATIENT
Start: 2024-03-06 | End: 2024-03-06

## 2024-03-06 RX ORDER — DEXAMETHASONE SODIUM PHOSPHATE 4 MG/ML
INJECTION, SOLUTION INTRA-ARTICULAR; INTRALESIONAL; INTRAMUSCULAR; INTRAVENOUS; SOFT TISSUE PRN
Status: DISCONTINUED | OUTPATIENT
Start: 2024-03-06 | End: 2024-03-06 | Stop reason: SDUPTHER

## 2024-03-06 RX ORDER — NALOXONE HYDROCHLORIDE 0.4 MG/ML
INJECTION, SOLUTION INTRAMUSCULAR; INTRAVENOUS; SUBCUTANEOUS PRN
Status: DISCONTINUED | OUTPATIENT
Start: 2024-03-06 | End: 2024-03-06

## 2024-03-06 RX ORDER — DIPHENHYDRAMINE HYDROCHLORIDE 50 MG/ML
INJECTION INTRAMUSCULAR; INTRAVENOUS PRN
Status: DISCONTINUED | OUTPATIENT
Start: 2024-03-06 | End: 2024-03-06 | Stop reason: SDUPTHER

## 2024-03-06 RX ORDER — FENTANYL CITRATE 50 UG/ML
INJECTION, SOLUTION INTRAMUSCULAR; INTRAVENOUS PRN
Status: DISCONTINUED | OUTPATIENT
Start: 2024-03-06 | End: 2024-03-06 | Stop reason: SDUPTHER

## 2024-03-06 RX ORDER — FAMOTIDINE 10 MG/ML
INJECTION, SOLUTION INTRAVENOUS PRN
Status: DISCONTINUED | OUTPATIENT
Start: 2024-03-06 | End: 2024-03-06 | Stop reason: SDUPTHER

## 2024-03-06 RX ORDER — EPINEPHRINE 1 MG/ML(1)
AMPUL (ML) INJECTION PRN
Status: DISCONTINUED | OUTPATIENT
Start: 2024-03-06 | End: 2024-03-06 | Stop reason: SDUPTHER

## 2024-03-06 RX ORDER — IBUPROFEN 600 MG/1
600 TABLET ORAL EVERY 6 HOURS PRN
Status: DISCONTINUED | OUTPATIENT
Start: 2024-03-06 | End: 2024-03-07 | Stop reason: HOSPADM

## 2024-03-06 RX ORDER — LABETALOL HYDROCHLORIDE 5 MG/ML
INJECTION, SOLUTION INTRAVENOUS PRN
Status: DISCONTINUED | OUTPATIENT
Start: 2024-03-06 | End: 2024-03-06 | Stop reason: SDUPTHER

## 2024-03-06 RX ORDER — BISACODYL 5 MG/1
5 TABLET, DELAYED RELEASE ORAL DAILY
Status: DISCONTINUED | OUTPATIENT
Start: 2024-03-06 | End: 2024-03-06

## 2024-03-06 RX ORDER — SODIUM CHLORIDE, SODIUM LACTATE, POTASSIUM CHLORIDE, CALCIUM CHLORIDE 600; 310; 30; 20 MG/100ML; MG/100ML; MG/100ML; MG/100ML
INJECTION, SOLUTION INTRAVENOUS CONTINUOUS
Status: DISCONTINUED | OUTPATIENT
Start: 2024-03-06 | End: 2024-03-07 | Stop reason: HOSPADM

## 2024-03-06 RX ORDER — SODIUM CHLORIDE, SODIUM LACTATE, POTASSIUM CHLORIDE, CALCIUM CHLORIDE 600; 310; 30; 20 MG/100ML; MG/100ML; MG/100ML; MG/100ML
INJECTION, SOLUTION INTRAVENOUS CONTINUOUS
Status: DISCONTINUED | OUTPATIENT
Start: 2024-03-06 | End: 2024-03-06 | Stop reason: HOSPADM

## 2024-03-06 RX ORDER — LORAZEPAM 0.5 MG/1
0.5 TABLET ORAL 2 TIMES DAILY
COMMUNITY

## 2024-03-06 RX ORDER — DIPHENHYDRAMINE HYDROCHLORIDE 50 MG/ML
25 INJECTION INTRAMUSCULAR; INTRAVENOUS EVERY 6 HOURS PRN
Status: DISCONTINUED | OUTPATIENT
Start: 2024-03-06 | End: 2024-03-07 | Stop reason: HOSPADM

## 2024-03-06 RX ADMIN — LABETALOL HYDROCHLORIDE 15 MG: 5 INJECTION, SOLUTION INTRAVENOUS at 12:31

## 2024-03-06 RX ADMIN — CEFAZOLIN SODIUM 600 MG: 1 INJECTION, POWDER, FOR SOLUTION INTRAMUSCULAR; INTRAVENOUS at 12:13

## 2024-03-06 RX ADMIN — DIPHENHYDRAMINE HYDROCHLORIDE 25 MG: 50 INJECTION INTRAMUSCULAR; INTRAVENOUS at 19:46

## 2024-03-06 RX ADMIN — DIPHENHYDRAMINE HYDROCHLORIDE 50 MG: 50 INJECTION INTRAMUSCULAR; INTRAVENOUS at 12:14

## 2024-03-06 RX ADMIN — Medication 50 MCG: at 12:22

## 2024-03-06 RX ADMIN — MIDAZOLAM HYDROCHLORIDE 2 MG: 1 INJECTION INTRAMUSCULAR; INTRAVENOUS at 12:13

## 2024-03-06 RX ADMIN — SODIUM CHLORIDE, POTASSIUM CHLORIDE, SODIUM LACTATE AND CALCIUM CHLORIDE: 600; 310; 30; 20 INJECTION, SOLUTION INTRAVENOUS at 11:22

## 2024-03-06 RX ADMIN — DEXAMETHASONE SODIUM PHOSPHATE 10 MG: 4 INJECTION, SOLUTION INTRA-ARTICULAR; INTRALESIONAL; INTRAMUSCULAR; INTRAVENOUS; SOFT TISSUE at 12:14

## 2024-03-06 RX ADMIN — IBUPROFEN 600 MG: 600 TABLET, FILM COATED ORAL at 13:58

## 2024-03-06 RX ADMIN — MIDAZOLAM HYDROCHLORIDE 2 MG: 1 INJECTION INTRAMUSCULAR; INTRAVENOUS at 12:30

## 2024-03-06 RX ADMIN — FAMOTIDINE 20 MG: 10 INJECTION, SOLUTION INTRAVENOUS at 12:16

## 2024-03-06 RX ADMIN — FENTANYL CITRATE 50 MCG: 50 INJECTION, SOLUTION INTRAMUSCULAR; INTRAVENOUS at 12:30

## 2024-03-06 ASSESSMENT — PAIN - FUNCTIONAL ASSESSMENT: PAIN_FUNCTIONAL_ASSESSMENT: 0-10

## 2024-03-06 ASSESSMENT — PAIN DESCRIPTION - LOCATION: LOCATION: HEAD

## 2024-03-06 ASSESSMENT — PAIN DESCRIPTION - DESCRIPTORS: DESCRIPTORS: SHARP

## 2024-03-06 NOTE — ANESTHESIA PRE PROCEDURE
Department of Anesthesiology  Preprocedure Note       Name:  Adeola Coffman   Age:  38 y.o.  :  1985                                          MRN:  [id number]         Date:  3/6/2024      Surgeon: Surgeon(s):  Nieves Freitas MD    Procedure: Procedure(s):  ABDOMINAL MYOMECTOMY    Medications prior to admission:   Prior to Admission medications    Medication Sig Start Date End Date Taking? Authorizing Provider   LORazepam (ATIVAN) 0.5 MG tablet Take 1 tablet by mouth in the morning and at bedtime. Max Daily Amount: 1 mg   Yes Provider, MD Charley       Current medications:    Current Facility-Administered Medications   Medication Dose Route Frequency Provider Last Rate Last Admin   • lactated ringers IV soln infusion   IntraVENous Continuous Nieves Freitas MD 20 mL/hr at 24 1122 New Bag at 24 1122   • ceFAZolin (ANCEF) 2,000 mg in sterile water 20 mL IV syringe  2,000 mg IntraVENous Once Nieves Freitas MD           Allergies:  No Known Allergies    Problem List:    Patient Active Problem List   Diagnosis Code   • Severe obesity (HCC) E66.01   • Uterine fibroid D25.9   • Fibroid, uterine D25.9       Past Medical History:        Diagnosis Date   • Anemia    • Anxiety    • Depression    • Fallopian tube disorder    • Fibroid    • Miscarriage        Past Surgical History:        Procedure Laterality Date   • ORTHOPEDIC SURGERY Left     knee bakers cyst   • WISDOM TOOTH EXTRACTION       or        Social History:    Social History     Tobacco Use   • Smoking status: Former   • Smokeless tobacco: Never   Substance Use Topics   • Alcohol use: Not Currently                                Counseling given: Not Answered      Vital Signs (Current):   Vitals:    24 1120   BP: (!) 137/90   Pulse: 75   Resp: 20   Temp: 98.3 °F (36.8 °C)   TempSrc: Oral   SpO2: 100%                                              BP Readings from Last 3 Encounters:

## 2024-03-07 ENCOUNTER — TELEPHONE (OUTPATIENT)
Age: 39
End: 2024-03-07

## 2024-03-07 ENCOUNTER — PREP FOR PROCEDURE (OUTPATIENT)
Age: 39
End: 2024-03-07

## 2024-03-07 VITALS
HEART RATE: 66 BPM | TEMPERATURE: 98 F | DIASTOLIC BLOOD PRESSURE: 99 MMHG | SYSTOLIC BLOOD PRESSURE: 150 MMHG | OXYGEN SATURATION: 100 % | RESPIRATION RATE: 18 BRPM

## 2024-03-07 PROCEDURE — G0378 HOSPITAL OBSERVATION PER HR: HCPCS

## 2024-03-07 PROCEDURE — 99213 OFFICE O/P EST LOW 20 MIN: CPT | Performed by: OBSTETRICS & GYNECOLOGY

## 2024-03-07 NOTE — TELEPHONE ENCOUNTER
CORRECTION: patient has been moved to 04/17, Dr. Vincent is out of the office on 04/03. Spoke with patient and she is aware of schedule change.

## 2024-03-07 NOTE — PLAN OF CARE
0900 Discharge instructions reviewed with patient.  Aware of warning signs and when to notify MD. Blood pressure machine given to patient and aware to keep track of Bps and follow up with primary care provider per dr Vincent. Patient aware Dr Vincent nurse will follow up with patient regarding her new surgery date.     Discharge plan of care/case management plan validated with provider discharge order.     0915 Patient discharged to main entrance via wheelchair in stable condition.   Problem: Pain  Goal: Verbalizes/displays adequate comfort level or baseline comfort level  Outcome: HH/HSPC Resolved Met  Flowsheets (Taken 3/6/2024 1945 by Stewart Benson, RN)  Verbalizes/displays adequate comfort level or baseline comfort level:   Encourage patient to monitor pain and request assistance   Assess pain using appropriate pain scale     Problem: Discharge Planning  Goal: Discharge to home or other facility with appropriate resources  Outcome: HH/HSPC Resolved Met  Flowsheets (Taken 3/6/2024 1945 by Stewart Benson, RN)  Discharge to home or other facility with appropriate resources: Identify barriers to discharge with patient and caregiver

## 2024-03-07 NOTE — H&P
Patient admitted to the floor for observation following anaphylaxis to antibiotics as patient was being taken to the OR for an abdominal myomectomy.    Patient currently stable  Will monitor over night

## 2024-03-07 NOTE — TELEPHONE ENCOUNTER
Patient was scheduled for surgery with Dr. Vincent for Abdominal Myomectomy on 3/6 was unable to have surgery due to medical issue prior to surgery. Per Dr. Vincent patient has been rescheduled and the next surgery date if 4/17    Patient has questions about her [facility] paperwork and stated that her job has not received them and they will need to be changed. She stated she has additional questions and mentioned your name. She would like a call back.

## 2024-03-07 NOTE — TELEPHONE ENCOUNTER
Spoke with Dr. Vincent and patient was not able to have her surgery on 03/06 and she wants the patient rescheduled. Spoke with patient and she is rescheduled for 04/03/24 for Abdominal Myomectomy. Patient is aware PAT will reach out to her prior to her surgery date.

## 2024-03-07 NOTE — DISCHARGE SUMMARY
Gynecology Discharge Summary     Name: Adeola Coffman MRN: [id number]  SSN: [id number]    YOB: 1985  Age: 38 y.o.  Sex: female      Allergies: Ancef [cefazolin], Cephalosporins, and Penicillins    Admit Date: 3/6/2024    Discharge Date: 3/7/2024      Admitting Physician: Nieves Freitas MD     Discharge Physician: Nieves Freitas MD     * Admission Diagnoses: Fibroid, uterine [D25.9]  Anaphylaxis due to antibacterial agent [T36.95XA]    * Discharge Diagnoses:      * Procedures:  none    Consults: None    * Discharge Condition: Stable        * Discharge Disposition: Home    Discharge Medications:      Medication List        ASK your doctor about these medications      LORazepam 0.5 MG tablet  Commonly known as: ATIVAN               * Follow-up Care/Patient Instructions:  With Dr Vincent    Follow-up Information       Follow up With Specialties Details Why Contact Info    Nieves Freitas MD Obstetrics & Gynecology Follow up  [address] [place] [id number] [phone]

## 2024-03-07 NOTE — TELEPHONE ENCOUNTER
Returned patients phone call to make her aware. Reports that surgery date was changed and will need paperwork to be changed to accommodate her leave. Advised she will received a call back to discuss further.

## 2024-03-14 ENCOUNTER — TELEPHONE (OUTPATIENT)
Age: 39
End: 2024-03-14

## 2024-03-14 NOTE — TELEPHONE ENCOUNTER
Called and spoke with Ms. Coffman about her current leave. Due to an allergic reaction right before surgery it had to be rescheduled. I explained to her that Dr. Vincent said she can go back to work now. We will cover from 02/21-03/15/24. She understood and will provide a new set of [facility] papers two weeks prior to her new surgery date.

## 2024-04-12 ENCOUNTER — HOSPITAL ENCOUNTER (OUTPATIENT)
Facility: HOSPITAL | Age: 39
Discharge: HOME | End: 2024-04-12
Payer: COMMERCIAL

## 2024-04-12 VITALS
BODY MASS INDEX: 34.52 KG/M2 | WEIGHT: 194.8 LBS | TEMPERATURE: 97.9 F | RESPIRATION RATE: 18 BRPM | SYSTOLIC BLOOD PRESSURE: 143 MMHG | DIASTOLIC BLOOD PRESSURE: 91 MMHG | HEIGHT: 63 IN | HEART RATE: 89 BPM | OXYGEN SATURATION: 100 %

## 2024-04-12 DIAGNOSIS — D21.9 FIBROID: Primary | ICD-10-CM

## 2024-04-12 LAB
ABO + RH BLD: NORMAL
BLOOD GROUP ANTIBODIES SERPL: NEGATIVE
EKG ATRIAL RATE: 84 BPM
EKG DIAGNOSIS: NORMAL
EKG P AXIS: 43 DEGREES
EKG P-R INTERVAL: 110 MS
EKG Q-T INTERVAL: 378 MS
EKG QRS DURATION: 70 MS
EKG QTC CALCULATION (BAZETT): 446 MS
EKG R AXIS: -11 DEGREES
EKG T AXIS: -5 DEGREES
EKG VENTRICULAR RATE: 84 BPM
ERYTHROCYTE [DISTWIDTH] IN BLOOD BY AUTOMATED COUNT: 14.8 % (ref 11.5–14.5)
HCT VFR BLD AUTO: 32 % (ref 35–47)
HGB BLD-MCNC: 10.2 G/DL (ref 11.5–16)
MCH RBC QN AUTO: 25.8 PG (ref 26–34)
MCHC RBC AUTO-ENTMCNC: 31.9 G/DL (ref 30–36.5)
MCV RBC AUTO: 80.8 FL (ref 80–99)
MRSA DNA SPEC QL NAA+PROBE: NOT DETECTED
NRBC # BLD: 0 K/UL (ref 0–0.01)
NRBC BLD-RTO: 0 PER 100 WBC
PLATELET # BLD AUTO: 257 K/UL (ref 150–400)
PMV BLD AUTO: 10.5 FL (ref 8.9–12.9)
RBC # BLD AUTO: 3.96 M/UL (ref 3.8–5.2)
SPECIMEN EXP DATE BLD: NORMAL
WBC # BLD AUTO: 4.7 K/UL (ref 3.6–11)

## 2024-04-12 PROCEDURE — 87641 MR-STAPH DNA AMP PROBE: CPT

## 2024-04-12 PROCEDURE — 93005 ELECTROCARDIOGRAM TRACING: CPT | Performed by: OBSTETRICS & GYNECOLOGY

## 2024-04-12 PROCEDURE — 36415 COLL VENOUS BLD VENIPUNCTURE: CPT

## 2024-04-12 PROCEDURE — 86901 BLOOD TYPING SEROLOGIC RH(D): CPT

## 2024-04-12 PROCEDURE — 86900 BLOOD TYPING SEROLOGIC ABO: CPT

## 2024-04-12 PROCEDURE — 85027 COMPLETE CBC AUTOMATED: CPT

## 2024-04-12 PROCEDURE — 86850 RBC ANTIBODY SCREEN: CPT

## 2024-04-12 RX ORDER — POLYETHYLENE GLYCOL-3350 AND ELECTROLYTES 236; 6.74; 5.86; 2.97; 22.74 G/274.31G; G/274.31G; G/274.31G; G/274.31G; G/274.31G
POWDER, FOR SOLUTION ORAL
Qty: 4000 EACH | Refills: 0 | Status: SHIPPED | OUTPATIENT
Start: 2024-04-12

## 2024-04-12 RX ORDER — ACETAMINOPHEN 160 MG
2000 TABLET,DISINTEGRATING ORAL DAILY
COMMUNITY

## 2024-04-12 NOTE — PERIOP NOTE
0849- Verified with Dr. Freitas that patient will be a 23 hour observation and that patient will need a bowel prep prior to surgery scheduled for 4/17/24.    1415- Patient stated during PAT that she was not instructed in regards to a bowel prep, patient made aware Dr. Freitas's office will send instructions. Spoke with Mounika triage nurse at Dr. Freitas's office she stated she will have Dr. Vincent send the bowel prep to the patient's pharmacy and she will send a message and instructions to the patient through her Shirley Mae'st.

## 2024-04-15 NOTE — PERIOP NOTE
1506- Followed up with the patient verified that she is aware of and received instructions in regards to bowel prep. Patient stated she will be picking the prep up from her pharmacy this afternoon.

## 2024-04-16 ENCOUNTER — ANESTHESIA EVENT (OUTPATIENT)
Facility: HOSPITAL | Age: 39
End: 2024-04-16
Payer: COMMERCIAL

## 2024-04-17 ENCOUNTER — ANESTHESIA (OUTPATIENT)
Facility: HOSPITAL | Age: 39
End: 2024-04-17
Payer: COMMERCIAL

## 2024-04-17 ENCOUNTER — HOSPITAL ENCOUNTER (OUTPATIENT)
Facility: HOSPITAL | Age: 39
Discharge: HOME | End: 2024-04-18
Attending: OBSTETRICS & GYNECOLOGY | Admitting: OBSTETRICS & GYNECOLOGY
Payer: COMMERCIAL

## 2024-04-17 DIAGNOSIS — Z98.890 S/P MYOMECTOMY: Primary | ICD-10-CM

## 2024-04-17 DIAGNOSIS — D25.9 FIBROID, UTERINE: ICD-10-CM

## 2024-04-17 PROBLEM — D21.9 FIBROID: Status: ACTIVE | Noted: 2024-04-17

## 2024-04-17 LAB — HCG UR QL: NEGATIVE

## 2024-04-17 PROCEDURE — 2709999900 HC NON-CHARGEABLE SUPPLY: Performed by: OBSTETRICS & GYNECOLOGY

## 2024-04-17 PROCEDURE — 2720000010 HC SURG SUPPLY STERILE: Performed by: OBSTETRICS & GYNECOLOGY

## 2024-04-17 PROCEDURE — 6360000002 HC RX W HCPCS: Performed by: OBSTETRICS & GYNECOLOGY

## 2024-04-17 PROCEDURE — 58545 LAPAROSCOPIC MYOMECTOMY: CPT | Performed by: OBSTETRICS & GYNECOLOGY

## 2024-04-17 PROCEDURE — 2580000003 HC RX 258: Performed by: OBSTETRICS & GYNECOLOGY

## 2024-04-17 PROCEDURE — 3700000001 HC ADD 15 MINUTES (ANESTHESIA): Performed by: OBSTETRICS & GYNECOLOGY

## 2024-04-17 PROCEDURE — 7100000001 HC PACU RECOVERY - ADDTL 15 MIN: Performed by: OBSTETRICS & GYNECOLOGY

## 2024-04-17 PROCEDURE — 2580000003 HC RX 258: Performed by: NURSE ANESTHETIST, CERTIFIED REGISTERED

## 2024-04-17 PROCEDURE — 3600000003 HC SURGERY LEVEL 3 BASE: Performed by: OBSTETRICS & GYNECOLOGY

## 2024-04-17 PROCEDURE — 6360000002 HC RX W HCPCS: Performed by: NURSE ANESTHETIST, CERTIFIED REGISTERED

## 2024-04-17 PROCEDURE — C1765 ADHESION BARRIER: HCPCS | Performed by: OBSTETRICS & GYNECOLOGY

## 2024-04-17 PROCEDURE — 88305 TISSUE EXAM BY PATHOLOGIST: CPT

## 2024-04-17 PROCEDURE — 6360000002 HC RX W HCPCS: Performed by: ANESTHESIOLOGY

## 2024-04-17 PROCEDURE — 81025 URINE PREGNANCY TEST: CPT

## 2024-04-17 PROCEDURE — 3700000000 HC ANESTHESIA ATTENDED CARE: Performed by: OBSTETRICS & GYNECOLOGY

## 2024-04-17 PROCEDURE — 7100000000 HC PACU RECOVERY - FIRST 15 MIN: Performed by: OBSTETRICS & GYNECOLOGY

## 2024-04-17 PROCEDURE — 2500000003 HC RX 250 WO HCPCS: Performed by: NURSE ANESTHETIST, CERTIFIED REGISTERED

## 2024-04-17 PROCEDURE — 3600000013 HC SURGERY LEVEL 3 ADDTL 15MIN: Performed by: OBSTETRICS & GYNECOLOGY

## 2024-04-17 PROCEDURE — 87086 URINE CULTURE/COLONY COUNT: CPT

## 2024-04-17 PROCEDURE — 6370000000 HC RX 637 (ALT 250 FOR IP): Performed by: OBSTETRICS & GYNECOLOGY

## 2024-04-17 DEVICE — BARRIER, ABSORBABLE, ADHESION
Type: IMPLANTABLE DEVICE | Site: ABDOMEN | Status: FUNCTIONAL
Brand: SEPRAFILM®

## 2024-04-17 RX ORDER — KETOROLAC TROMETHAMINE 30 MG/ML
INJECTION, SOLUTION INTRAMUSCULAR; INTRAVENOUS PRN
Status: DISCONTINUED | OUTPATIENT
Start: 2024-04-17 | End: 2024-04-17 | Stop reason: SDUPTHER

## 2024-04-17 RX ORDER — KETOROLAC TROMETHAMINE 30 MG/ML
30 INJECTION, SOLUTION INTRAMUSCULAR; INTRAVENOUS EVERY 6 HOURS
Status: DISCONTINUED | OUTPATIENT
Start: 2024-04-17 | End: 2024-04-17

## 2024-04-17 RX ORDER — SODIUM CHLORIDE, SODIUM LACTATE, POTASSIUM CHLORIDE, CALCIUM CHLORIDE 600; 310; 30; 20 MG/100ML; MG/100ML; MG/100ML; MG/100ML
INJECTION, SOLUTION INTRAVENOUS ONCE
Status: DISCONTINUED | OUTPATIENT
Start: 2024-04-17 | End: 2024-04-17 | Stop reason: HOSPADM

## 2024-04-17 RX ORDER — IPRATROPIUM BROMIDE AND ALBUTEROL SULFATE 2.5; .5 MG/3ML; MG/3ML
1 SOLUTION RESPIRATORY (INHALATION)
Status: DISCONTINUED | OUTPATIENT
Start: 2024-04-17 | End: 2024-04-17 | Stop reason: HOSPADM

## 2024-04-17 RX ORDER — LIDOCAINE HYDROCHLORIDE 20 MG/ML
INJECTION, SOLUTION EPIDURAL; INFILTRATION; INTRACAUDAL; PERINEURAL PRN
Status: DISCONTINUED | OUTPATIENT
Start: 2024-04-17 | End: 2024-04-17 | Stop reason: SDUPTHER

## 2024-04-17 RX ORDER — LIDOCAINE 4 G/G
1 PATCH TOPICAL AS NEEDED
Status: DISCONTINUED | OUTPATIENT
Start: 2024-04-17 | End: 2024-04-17 | Stop reason: HOSPADM

## 2024-04-17 RX ORDER — OXYCODONE HYDROCHLORIDE AND ACETAMINOPHEN 5; 325 MG/1; MG/1
1 TABLET ORAL EVERY 4 HOURS PRN
Status: DISCONTINUED | OUTPATIENT
Start: 2024-04-17 | End: 2024-04-18 | Stop reason: HOSPADM

## 2024-04-17 RX ORDER — LORAZEPAM 2 MG/ML
0.5 INJECTION INTRAMUSCULAR
Status: DISCONTINUED | OUTPATIENT
Start: 2024-04-17 | End: 2024-04-17 | Stop reason: HOSPADM

## 2024-04-17 RX ORDER — SUCCINYLCHOLINE/SOD CL,ISO/PF 200MG/10ML
SYRINGE (ML) INTRAVENOUS PRN
Status: DISCONTINUED | OUTPATIENT
Start: 2024-04-17 | End: 2024-04-17 | Stop reason: SDUPTHER

## 2024-04-17 RX ORDER — GLUCAGON 1 MG/ML
1 KIT INJECTION PRN
Status: DISCONTINUED | OUTPATIENT
Start: 2024-04-17 | End: 2024-04-17 | Stop reason: HOSPADM

## 2024-04-17 RX ORDER — OXYCODONE HYDROCHLORIDE AND ACETAMINOPHEN 5; 325 MG/1; MG/1
2 TABLET ORAL EVERY 6 HOURS PRN
Status: DISCONTINUED | OUTPATIENT
Start: 2024-04-17 | End: 2024-04-18 | Stop reason: HOSPADM

## 2024-04-17 RX ORDER — MORPHINE SULFATE/0.9% NACL/PF 1 MG/ML
SYRINGE (ML) INJECTION CONTINUOUS
Status: DISCONTINUED | OUTPATIENT
Start: 2024-04-17 | End: 2024-04-18 | Stop reason: ALTCHOICE

## 2024-04-17 RX ORDER — ONDANSETRON 2 MG/ML
INJECTION INTRAMUSCULAR; INTRAVENOUS PRN
Status: DISCONTINUED | OUTPATIENT
Start: 2024-04-17 | End: 2024-04-17 | Stop reason: SDUPTHER

## 2024-04-17 RX ORDER — DOCUSATE SODIUM 100 MG/1
100 CAPSULE, LIQUID FILLED ORAL 2 TIMES DAILY
Status: DISCONTINUED | OUTPATIENT
Start: 2024-04-17 | End: 2024-04-18 | Stop reason: HOSPADM

## 2024-04-17 RX ORDER — METOCLOPRAMIDE HYDROCHLORIDE 5 MG/ML
10 INJECTION INTRAMUSCULAR; INTRAVENOUS
Status: DISCONTINUED | OUTPATIENT
Start: 2024-04-17 | End: 2024-04-17 | Stop reason: HOSPADM

## 2024-04-17 RX ORDER — NALOXONE HYDROCHLORIDE 0.4 MG/ML
INJECTION, SOLUTION INTRAMUSCULAR; INTRAVENOUS; SUBCUTANEOUS PRN
Status: DISCONTINUED | OUTPATIENT
Start: 2024-04-17 | End: 2024-04-17 | Stop reason: HOSPADM

## 2024-04-17 RX ORDER — PROPOFOL 10 MG/ML
INJECTION, EMULSION INTRAVENOUS PRN
Status: DISCONTINUED | OUTPATIENT
Start: 2024-04-17 | End: 2024-04-17 | Stop reason: SDUPTHER

## 2024-04-17 RX ORDER — ONDANSETRON 4 MG/1
4 TABLET, ORALLY DISINTEGRATING ORAL EVERY 8 HOURS PRN
Status: DISCONTINUED | OUTPATIENT
Start: 2024-04-17 | End: 2024-04-18 | Stop reason: HOSPADM

## 2024-04-17 RX ORDER — ONDANSETRON 2 MG/ML
4 INJECTION INTRAMUSCULAR; INTRAVENOUS
Status: DISCONTINUED | OUTPATIENT
Start: 2024-04-17 | End: 2024-04-17 | Stop reason: HOSPADM

## 2024-04-17 RX ORDER — DIPHENHYDRAMINE HCL 25 MG
25 CAPSULE ORAL EVERY 4 HOURS PRN
Status: DISCONTINUED | OUTPATIENT
Start: 2024-04-17 | End: 2024-04-18 | Stop reason: HOSPADM

## 2024-04-17 RX ORDER — FENTANYL CITRATE 50 UG/ML
INJECTION, SOLUTION INTRAMUSCULAR; INTRAVENOUS PRN
Status: DISCONTINUED | OUTPATIENT
Start: 2024-04-17 | End: 2024-04-17 | Stop reason: SDUPTHER

## 2024-04-17 RX ORDER — SODIUM CHLORIDE 0.9 % (FLUSH) 0.9 %
5-40 SYRINGE (ML) INJECTION PRN
Status: DISCONTINUED | OUTPATIENT
Start: 2024-04-17 | End: 2024-04-18 | Stop reason: HOSPADM

## 2024-04-17 RX ORDER — SODIUM CHLORIDE 9 MG/ML
INJECTION, SOLUTION INTRAVENOUS PRN
Status: DISCONTINUED | OUTPATIENT
Start: 2024-04-17 | End: 2024-04-18 | Stop reason: HOSPADM

## 2024-04-17 RX ORDER — SODIUM CHLORIDE 0.9 % (FLUSH) 0.9 %
5-40 SYRINGE (ML) INJECTION PRN
Status: DISCONTINUED | OUTPATIENT
Start: 2024-04-17 | End: 2024-04-17 | Stop reason: HOSPADM

## 2024-04-17 RX ORDER — SODIUM CHLORIDE 0.9 % (FLUSH) 0.9 %
5-40 SYRINGE (ML) INJECTION EVERY 12 HOURS SCHEDULED
Status: DISCONTINUED | OUTPATIENT
Start: 2024-04-17 | End: 2024-04-18 | Stop reason: HOSPADM

## 2024-04-17 RX ORDER — HYDROMORPHONE HYDROCHLORIDE 1 MG/ML
0.5 INJECTION, SOLUTION INTRAMUSCULAR; INTRAVENOUS; SUBCUTANEOUS EVERY 5 MIN PRN
Status: DISCONTINUED | OUTPATIENT
Start: 2024-04-17 | End: 2024-04-17 | Stop reason: HOSPADM

## 2024-04-17 RX ORDER — FENTANYL CITRATE 50 UG/ML
50 INJECTION, SOLUTION INTRAMUSCULAR; INTRAVENOUS EVERY 5 MIN PRN
Status: DISCONTINUED | OUTPATIENT
Start: 2024-04-17 | End: 2024-04-17 | Stop reason: HOSPADM

## 2024-04-17 RX ORDER — POLYETHYLENE GLYCOL 3350 17 G/17G
17 POWDER, FOR SOLUTION ORAL DAILY PRN
Status: DISCONTINUED | OUTPATIENT
Start: 2024-04-17 | End: 2024-04-18 | Stop reason: ALTCHOICE

## 2024-04-17 RX ORDER — SODIUM CHLORIDE 9 MG/ML
INJECTION, SOLUTION INTRAVENOUS PRN
Status: DISCONTINUED | OUTPATIENT
Start: 2024-04-17 | End: 2024-04-17 | Stop reason: HOSPADM

## 2024-04-17 RX ORDER — CLINDAMYCIN PHOSPHATE 900 MG/50ML
900 INJECTION, SOLUTION INTRAVENOUS EVERY 8 HOURS
Status: DISCONTINUED | OUTPATIENT
Start: 2024-04-17 | End: 2024-04-17 | Stop reason: HOSPADM

## 2024-04-17 RX ORDER — KETOROLAC TROMETHAMINE 30 MG/ML
30 INJECTION, SOLUTION INTRAMUSCULAR; INTRAVENOUS EVERY 6 HOURS
Status: COMPLETED | OUTPATIENT
Start: 2024-04-17 | End: 2024-04-18

## 2024-04-17 RX ORDER — DIPHENHYDRAMINE HYDROCHLORIDE 50 MG/ML
12.5 INJECTION INTRAMUSCULAR; INTRAVENOUS
Status: DISCONTINUED | OUTPATIENT
Start: 2024-04-17 | End: 2024-04-17 | Stop reason: HOSPADM

## 2024-04-17 RX ORDER — ACETAMINOPHEN 500 MG
1000 TABLET ORAL EVERY 8 HOURS SCHEDULED
Status: DISCONTINUED | OUTPATIENT
Start: 2024-04-17 | End: 2024-04-18 | Stop reason: HOSPADM

## 2024-04-17 RX ORDER — BUPIVACAINE HYDROCHLORIDE 5 MG/ML
INJECTION, SOLUTION EPIDURAL; INTRACAUDAL PRN
Status: DISCONTINUED | OUTPATIENT
Start: 2024-04-17 | End: 2024-04-17 | Stop reason: ALTCHOICE

## 2024-04-17 RX ORDER — SODIUM CHLORIDE, SODIUM LACTATE, POTASSIUM CHLORIDE, CALCIUM CHLORIDE 600; 310; 30; 20 MG/100ML; MG/100ML; MG/100ML; MG/100ML
INJECTION, SOLUTION INTRAVENOUS CONTINUOUS PRN
Status: DISCONTINUED | OUTPATIENT
Start: 2024-04-17 | End: 2024-04-17 | Stop reason: SDUPTHER

## 2024-04-17 RX ORDER — ROCURONIUM BROMIDE 10 MG/ML
INJECTION, SOLUTION INTRAVENOUS PRN
Status: DISCONTINUED | OUTPATIENT
Start: 2024-04-17 | End: 2024-04-17 | Stop reason: SDUPTHER

## 2024-04-17 RX ORDER — OXYCODONE HYDROCHLORIDE 5 MG/1
10 TABLET ORAL PRN
Status: DISCONTINUED | OUTPATIENT
Start: 2024-04-17 | End: 2024-04-17 | Stop reason: HOSPADM

## 2024-04-17 RX ORDER — ONDANSETRON 2 MG/ML
4 INJECTION INTRAMUSCULAR; INTRAVENOUS EVERY 6 HOURS PRN
Status: DISCONTINUED | OUTPATIENT
Start: 2024-04-17 | End: 2024-04-18 | Stop reason: HOSPADM

## 2024-04-17 RX ORDER — MEPERIDINE HYDROCHLORIDE 25 MG/ML
12.5 INJECTION INTRAMUSCULAR; INTRAVENOUS; SUBCUTANEOUS EVERY 5 MIN PRN
Status: DISCONTINUED | OUTPATIENT
Start: 2024-04-17 | End: 2024-04-17 | Stop reason: HOSPADM

## 2024-04-17 RX ORDER — DEXAMETHASONE SODIUM PHOSPHATE 4 MG/ML
INJECTION, SOLUTION INTRA-ARTICULAR; INTRALESIONAL; INTRAMUSCULAR; INTRAVENOUS; SOFT TISSUE PRN
Status: DISCONTINUED | OUTPATIENT
Start: 2024-04-17 | End: 2024-04-17 | Stop reason: SDUPTHER

## 2024-04-17 RX ORDER — SODIUM CHLORIDE, SODIUM LACTATE, POTASSIUM CHLORIDE, CALCIUM CHLORIDE 600; 310; 30; 20 MG/100ML; MG/100ML; MG/100ML; MG/100ML
INJECTION, SOLUTION INTRAVENOUS CONTINUOUS
Status: DISCONTINUED | OUTPATIENT
Start: 2024-04-17 | End: 2024-04-17 | Stop reason: HOSPADM

## 2024-04-17 RX ORDER — OXYCODONE HYDROCHLORIDE 5 MG/1
5 TABLET ORAL PRN
Status: DISCONTINUED | OUTPATIENT
Start: 2024-04-17 | End: 2024-04-17 | Stop reason: HOSPADM

## 2024-04-17 RX ORDER — LABETALOL HYDROCHLORIDE 5 MG/ML
10 INJECTION, SOLUTION INTRAVENOUS
Status: DISCONTINUED | OUTPATIENT
Start: 2024-04-17 | End: 2024-04-17 | Stop reason: HOSPADM

## 2024-04-17 RX ORDER — HYDRALAZINE HYDROCHLORIDE 20 MG/ML
10 INJECTION INTRAMUSCULAR; INTRAVENOUS
Status: DISCONTINUED | OUTPATIENT
Start: 2024-04-17 | End: 2024-04-17 | Stop reason: HOSPADM

## 2024-04-17 RX ORDER — SODIUM CHLORIDE 0.9 % (FLUSH) 0.9 %
5-40 SYRINGE (ML) INJECTION EVERY 12 HOURS SCHEDULED
Status: DISCONTINUED | OUTPATIENT
Start: 2024-04-17 | End: 2024-04-17 | Stop reason: HOSPADM

## 2024-04-17 RX ORDER — IBUPROFEN 800 MG/1
800 TABLET ORAL EVERY 8 HOURS
Status: DISCONTINUED | OUTPATIENT
Start: 2024-04-18 | End: 2024-04-17

## 2024-04-17 RX ORDER — IBUPROFEN 800 MG/1
800 TABLET ORAL EVERY 6 HOURS PRN
Status: DISCONTINUED | OUTPATIENT
Start: 2024-04-17 | End: 2024-04-18 | Stop reason: HOSPADM

## 2024-04-17 RX ORDER — NALOXONE HYDROCHLORIDE 0.4 MG/ML
INJECTION, SOLUTION INTRAMUSCULAR; INTRAVENOUS; SUBCUTANEOUS PRN
Status: DISCONTINUED | OUTPATIENT
Start: 2024-04-17 | End: 2024-04-18 | Stop reason: ALTCHOICE

## 2024-04-17 RX ORDER — MIDAZOLAM HYDROCHLORIDE 1 MG/ML
INJECTION INTRAMUSCULAR; INTRAVENOUS PRN
Status: DISCONTINUED | OUTPATIENT
Start: 2024-04-17 | End: 2024-04-17 | Stop reason: SDUPTHER

## 2024-04-17 RX ORDER — IBUPROFEN 800 MG/1
800 TABLET ORAL EVERY 8 HOURS
Status: DISCONTINUED | OUTPATIENT
Start: 2024-04-18 | End: 2024-04-18 | Stop reason: HOSPADM

## 2024-04-17 RX ORDER — DEXTROSE MONOHYDRATE 100 MG/ML
INJECTION, SOLUTION INTRAVENOUS CONTINUOUS PRN
Status: DISCONTINUED | OUTPATIENT
Start: 2024-04-17 | End: 2024-04-17 | Stop reason: HOSPADM

## 2024-04-17 RX ADMIN — SUGAMMADEX 200 MG: 100 INJECTION, SOLUTION INTRAVENOUS at 13:11

## 2024-04-17 RX ADMIN — SODIUM CHLORIDE, PRESERVATIVE FREE 10 ML: 5 INJECTION INTRAVENOUS at 20:33

## 2024-04-17 RX ADMIN — SODIUM CHLORIDE, POTASSIUM CHLORIDE, SODIUM LACTATE AND CALCIUM CHLORIDE: 600; 310; 30; 20 INJECTION, SOLUTION INTRAVENOUS at 12:55

## 2024-04-17 RX ADMIN — SODIUM CHLORIDE, POTASSIUM CHLORIDE, SODIUM LACTATE AND CALCIUM CHLORIDE: 600; 310; 30; 20 INJECTION, SOLUTION INTRAVENOUS at 11:01

## 2024-04-17 RX ADMIN — DOCUSATE SODIUM 100 MG: 100 CAPSULE, LIQUID FILLED ORAL at 15:25

## 2024-04-17 RX ADMIN — ACETAMINOPHEN 1000 MG: 500 TABLET ORAL at 21:37

## 2024-04-17 RX ADMIN — SODIUM CHLORIDE, POTASSIUM CHLORIDE, SODIUM LACTATE AND CALCIUM CHLORIDE: 600; 310; 30; 20 INJECTION, SOLUTION INTRAVENOUS at 10:27

## 2024-04-17 RX ADMIN — ACETAMINOPHEN 1000 MG: 500 TABLET ORAL at 15:25

## 2024-04-17 RX ADMIN — MIDAZOLAM HYDROCHLORIDE 2 MG: 2 INJECTION, SOLUTION INTRAMUSCULAR; INTRAVENOUS at 11:01

## 2024-04-17 RX ADMIN — FENTANYL CITRATE 50 MCG: 50 INJECTION, SOLUTION INTRAMUSCULAR; INTRAVENOUS at 13:37

## 2024-04-17 RX ADMIN — PROPOFOL 150 MG: 10 INJECTION, EMULSION INTRAVENOUS at 11:08

## 2024-04-17 RX ADMIN — DIPHENHYDRAMINE HYDROCHLORIDE 25 MG: 25 CAPSULE ORAL at 21:52

## 2024-04-17 RX ADMIN — ONDANSETRON 4 MG: 2 INJECTION INTRAMUSCULAR; INTRAVENOUS at 11:15

## 2024-04-17 RX ADMIN — Medication 120 MG: at 11:08

## 2024-04-17 RX ADMIN — ROCURONIUM BROMIDE 5 MG: 10 INJECTION INTRAVENOUS at 11:08

## 2024-04-17 RX ADMIN — FENTANYL CITRATE 50 MCG: 50 INJECTION, SOLUTION INTRAMUSCULAR; INTRAVENOUS at 11:08

## 2024-04-17 RX ADMIN — HYDROMORPHONE HYDROCHLORIDE 0.5 MG: 1 INJECTION, SOLUTION INTRAMUSCULAR; INTRAVENOUS; SUBCUTANEOUS at 13:10

## 2024-04-17 RX ADMIN — KETOROLAC TROMETHAMINE 30 MG: 30 INJECTION, SOLUTION INTRAMUSCULAR at 13:12

## 2024-04-17 RX ADMIN — ROCURONIUM BROMIDE 45 MG: 10 INJECTION INTRAVENOUS at 11:15

## 2024-04-17 RX ADMIN — HYDROMORPHONE HYDROCHLORIDE 0.25 MG: 1 INJECTION, SOLUTION INTRAMUSCULAR; INTRAVENOUS; SUBCUTANEOUS at 12:27

## 2024-04-17 RX ADMIN — LIDOCAINE HYDROCHLORIDE 100 MG: 20 INJECTION, SOLUTION EPIDURAL; INFILTRATION; INTRACAUDAL; PERINEURAL at 11:08

## 2024-04-17 RX ADMIN — CLINDAMYCIN IN 5 PERCENT DEXTROSE 900 MG: 18 INJECTION, SOLUTION INTRAVENOUS at 10:27

## 2024-04-17 RX ADMIN — FENTANYL CITRATE 50 MCG: 50 INJECTION, SOLUTION INTRAMUSCULAR; INTRAVENOUS at 14:10

## 2024-04-17 RX ADMIN — DOCUSATE SODIUM 100 MG: 100 CAPSULE, LIQUID FILLED ORAL at 21:37

## 2024-04-17 RX ADMIN — FENTANYL CITRATE 50 MCG: 50 INJECTION, SOLUTION INTRAMUSCULAR; INTRAVENOUS at 11:57

## 2024-04-17 RX ADMIN — DEXAMETHASONE SODIUM PHOSPHATE 4 MG: 4 INJECTION, SOLUTION INTRA-ARTICULAR; INTRALESIONAL; INTRAMUSCULAR; INTRAVENOUS; SOFT TISSUE at 11:15

## 2024-04-17 RX ADMIN — HYDROMORPHONE HYDROCHLORIDE 0.25 MG: 1 INJECTION, SOLUTION INTRAMUSCULAR; INTRAVENOUS; SUBCUTANEOUS at 12:06

## 2024-04-17 RX ADMIN — KETOROLAC TROMETHAMINE 30 MG: 30 INJECTION, SOLUTION INTRAMUSCULAR at 20:32

## 2024-04-17 RX ADMIN — Medication: at 14:33

## 2024-04-17 ASSESSMENT — PAIN SCALES - GENERAL
PAINLEVEL_OUTOF10: 0
PAINLEVEL_OUTOF10: 0
PAINLEVEL_OUTOF10: 8
PAINLEVEL_OUTOF10: 9

## 2024-04-17 ASSESSMENT — PAIN DESCRIPTION - LOCATION
LOCATION: ABDOMEN
LOCATION: ABDOMEN

## 2024-04-17 ASSESSMENT — PAIN - FUNCTIONAL ASSESSMENT: PAIN_FUNCTIONAL_ASSESSMENT: 0-10

## 2024-04-17 NOTE — ANESTHESIA PRE PROCEDURE
Department of Anesthesiology  Preprocedure Note       Name:  Adeola Coffman   Age:  38 y.o.  :  1985                                          MRN:  [id number]         Date:  2024      Surgeon: Surgeon(s):  Nieves Freitas MD    Procedure: Procedure(s):  ABDOMINAL MYOMECTOMY    Medications prior to admission:   Prior to Admission medications    Medication Sig Start Date End Date Taking? Authorizing Provider   sertraline (ZOLOFT) 50 MG tablet Take 1 tablet by mouth daily    ProviderCharley MD   vitamin D (VITAMIN D3) 50 MCG ( UT) CAPS capsule Take 1 capsule by mouth daily    ProviderCharley MD   polyethylene glycol (GAVILYTE-G) 236 g solution As directed 24   Nieves Freitas MD   LORazepam (ATIVAN) 0.5 MG tablet Take 1 tablet by mouth in the morning and at bedtime.    ProviderCharley MD       Current medications:    No current facility-administered medications for this encounter.     Current Outpatient Medications   Medication Sig Dispense Refill    sertraline (ZOLOFT) 50 MG tablet Take 1 tablet by mouth daily      vitamin D (VITAMIN D3) 50 MCG ( UT) CAPS capsule Take 1 capsule by mouth daily      polyethylene glycol (GAVILYTE-G) 236 g solution As directed 4000 each 0    LORazepam (ATIVAN) 0.5 MG tablet Take 1 tablet by mouth in the morning and at bedtime.         Allergies:    Allergies   Allergen Reactions    Ancef [Cefazolin] Anaphylaxis     IMMEDIATE REACTION TO IV ANCEF THAT WAS GIVEN PRE-OPERATIVELY ON 3/6/24    Cephalosporins Anaphylaxis    Penicillins Anaphylaxis       Problem List:    Patient Active Problem List   Diagnosis Code    Severe obesity (HCC) E66.01    Uterine fibroid D25.9    Fibroid, uterine D25.9    Anaphylaxis due to antibacterial agent T36.95XA       Past Medical History:        Diagnosis Date    Allergic reaction     Patient had an anaphylactic reaction to Ancef preop on 3/6/24.    Anemia     Anxiety     Depression

## 2024-04-17 NOTE — ANESTHESIA POSTPROCEDURE EVALUATION
Department of Anesthesiology  Postprocedure Note    Patient: Adeola Coffman  MRN: [id number]  YOB: 1985  Date of evaluation: 4/17/2024    Procedure Summary       Date: 04/17/24 Room / Location: [facility] MAIN OR 09 / [facility] MAIN OR    Anesthesia Start: 1101 Anesthesia Stop: 1327    Procedure: ABDOMINAL MYOMECTOMY (Abdomen) Diagnosis:       Fibroid, uterine      (Fibroid, uterine [D25.9])    Surgeons: Nieves Freitas MD Responsible Provider: Sunday Farias MD    Anesthesia Type: General ASA Status: 2            Anesthesia Type: General    Stephanie Phase I: Stephanie Score: 9    Stephanie Phase II:      Anesthesia Post Evaluation    Patient location during evaluation: PACU  Patient participation: complete - patient participated  Level of consciousness: sleepy but conscious  Pain score: 0  Airway patency: patent  Nausea & Vomiting: no nausea and no vomiting  Cardiovascular status: hemodynamically stable  Respiratory status: acceptable  Hydration status: stable  Multimodal analgesia pain management approach    No notable events documented.

## 2024-04-17 NOTE — PLAN OF CARE
Problem: ABCDS Injury Assessment  Goal: Absence of physical injury  Outcome: Progressing     Problem: Pain  Goal: Verbalizes/displays adequate comfort level or baseline comfort level  Outcome: Progressing  Flowsheets (Taken 4/17/2024 1370)  Verbalizes/displays adequate comfort level or baseline comfort level:   Encourage patient to monitor pain and request assistance   Assess pain using appropriate pain scale   Administer analgesics based on type and severity of pain and evaluate response     Problem: Safety - Adult  Goal: Free from fall injury  Outcome: Progressing

## 2024-04-17 NOTE — OP NOTE
Operative Note      Patient: Adeola Coffman  YOB: 1985  MRN: [id number]    Date of Procedure: 4/17/2024    Pre-Op Diagnosis Codes:     * Fibroid, uterine [D25.9]    Post-Op Diagnosis: Same       Procedure(s):  ABDOMINAL MYOMECTOMY    Surgeon(s):  Nieves Freitas MD    Assistant:   Surgical Assistant: Kingsley Leung    Anesthesia: General    Estimated Blood Loss (mL): 200     Complications: None    Specimens:   ID Type Source Tests Collected by Time Destination   1 : Uterine Fibroids Tissue Uterus SURGICAL PATHOLOGY Nieves Freitas MD 4/17/2024 1250    A : LIRA CATH URINE CULTURE Urine Urine, indwelling catheter CULTURE, URINE Nieves Freitas MD 4/17/2024 1118        Implants:  Implant Name Type Inv. Item Serial No.  Lot No. LRB No. Used Action   BARRIER ADH SHT 6X5 IN SODIUM HYALURONATE CMC SEPRAFILM - SN/A  BARRIER ADH SHT 6X5 IN SODIUM HYALURONATE CMC SEPRAFILM N/A [employer]-WD [id number] N/A 1 Implanted         Drains:   Urinary Catheter 04/17/24 Lira (Active)       Findings:  Infection Present At Time Of Surgery (PATOS) (choose all levels that have infection present):  No infection present  Other Findings: enlarged fibroid uterus    Detailed Description of Procedure:   The patient was taken to the operating room where she was prepped and draped in the normal sterile fashion in the dorsal supine position. After the general anesthetic was found to be adequate, a Pfannenstiel skin incision was made with the first knife. This was carried through the underlying layer of fascia with the bovie. The fascia was incised in the midline with the  and the fascial incision was then extended laterally in both directions with the Casillas scissors. The superior aspect of the fascial incision was then grasped with Applesner clamps, tented up, and dissected off the underlying layer of rectus muscle bluntly. It was then dissected in the middle with the Vinny

## 2024-04-17 NOTE — H&P
Gynecology History and Physical    Name: Adeola Coffman MRN: [id number] SSN: [id number]    YOB: 1985  Age: 38 y.o.  Sex: female       Subjective:      Chief complaint:  Fibroids    Adeola is a 38 y.o.  female with a history of fibroids. Patient presents for follow up  Continues to experience irregular menstrual cycles     History uterine fibroids with associated heavy menstrual cycles   Recent pregnancy loss in   Ultrasound that was obtained during pregnancy loss revealed three uterine fibroids with largest subserosal measuring > 5 cm  Patient reports menstrual cycles interfere with day to day activities- bleeding heavy with flow , changing pads frequently,  bleeding through clothes  Cycles are associated with severe cramping pain requiring missed days from work and activities  Patient desires to try and conceive in the future but fears she may suffer another pregnancy loss as a result to the fibroids  Discussed with patient patient how fibroids can impact fertility- including but not limited to recurrent loss- with intracavitary / submucosal location,  labor, malpresentation, birthing,  Discussed with patient how location of fibroids can have impact on menstrual flow- heavy flow, clots, pelvic pain  Discussed with patient  causes of pregnancy loss  All questions answered  States comprehension    OB History          2    Para        Term                AB   1    Living             SAB        IAB        Ectopic        Molar        Multiple        Live Births                  Past Medical History:   Diagnosis Date    Allergic reaction     Patient had an anaphylactic reaction to Ancef preop on 3/6/24.    Anemia     Anxiety     Depression     Fallopian tube disorder     Fibroid     Miscarriage      Past Surgical History:   Procedure Laterality Date    ORTHOPEDIC SURGERY Left 2007    knee bakers cyst    WISDOM TOOTH EXTRACTION      2015 or 2016     Social  History     Occupational History    Not on file   Tobacco Use    Smoking status: Former     Types: Cigarettes     Start date: 2019    Smokeless tobacco: Never   Vaping Use    Vaping Use: Never used   Substance and Sexual Activity    Alcohol use: Not Currently    Drug use: Yes     Types: Marijuana (Weed)     Comment: Patient stated may smoke a few times a week.    Sexual activity: Yes     Partners: Male     Birth control/protection: None     Family History   Problem Relation Age of Onset    Colon Cancer Maternal Grandmother     Stroke Mother     Stroke Sister         Allergies   Allergen Reactions    Ancef [Cefazolin] Anaphylaxis     IMMEDIATE REACTION TO IV ANCEF THAT WAS GIVEN PRE-OPERATIVELY ON 3/6/24    Cephalosporins Anaphylaxis    Penicillins Anaphylaxis     Prior to Admission medications    Medication Sig Start Date End Date Taking? Authorizing Provider   sertraline (ZOLOFT) 50 MG tablet Take 1 tablet by mouth daily    Charley Da Silva MD   vitamin D (VITAMIN D3) 50 MCG (2000 UT) CAPS capsule Take 1 capsule by mouth daily    Charley Da Silva MD   polyethylene glycol (GAVILYTE-G) 236 g solution As directed 4/12/24   Nieves Freitas MD   LORazepam (ATIVAN) 0.5 MG tablet Take 1 tablet by mouth in the morning and at bedtime.    ProviderCharley MD        Review of Systems  A comprehensive review of systems was negative except for that written in the History of Present Illness.    Objective:     Vitals:    04/17/24 0900   BP: 123/70   Pulse: 74   Resp: 18   Temp: 97.6 °F (36.4 °C)   TempSrc: Oral   SpO2: 100%       Physical Exam:  Patient without distress  Heart: S1S2 present  Lung: normal respiratory effort  Back: costovertebral angle tenderness absent  Abdomen: soft, nontender  External Genitalia: SVE deferred to OR and :normal general appearance\"    Assessment/Plan:     Principal Problem:    Fibroid, uterine  Active Problems:    Fibroid  Resolved Problems:    * No resolved hospital

## 2024-04-18 VITALS
RESPIRATION RATE: 16 BRPM | TEMPERATURE: 97.8 F | SYSTOLIC BLOOD PRESSURE: 154 MMHG | HEART RATE: 71 BPM | OXYGEN SATURATION: 100 % | DIASTOLIC BLOOD PRESSURE: 94 MMHG

## 2024-04-18 LAB
BACTERIA SPEC CULT: NORMAL
ERYTHROCYTE [DISTWIDTH] IN BLOOD BY AUTOMATED COUNT: 14.8 % (ref 11.5–14.5)
HCT VFR BLD AUTO: 34.3 % (ref 35–47)
HGB BLD-MCNC: 10.9 G/DL (ref 11.5–16)
Lab: NORMAL
MCH RBC QN AUTO: 25.3 PG (ref 26–34)
MCHC RBC AUTO-ENTMCNC: 31.8 G/DL (ref 30–36.5)
MCV RBC AUTO: 79.6 FL (ref 80–99)
NRBC # BLD: 0 K/UL (ref 0–0.01)
NRBC BLD-RTO: 0 PER 100 WBC
PLATELET # BLD AUTO: 314 K/UL (ref 150–400)
PMV BLD AUTO: 11.8 FL (ref 8.9–12.9)
RBC # BLD AUTO: 4.31 M/UL (ref 3.8–5.2)
WBC # BLD AUTO: 6.9 K/UL (ref 3.6–11)

## 2024-04-18 PROCEDURE — 6360000002 HC RX W HCPCS: Performed by: OBSTETRICS & GYNECOLOGY

## 2024-04-18 PROCEDURE — 6370000000 HC RX 637 (ALT 250 FOR IP): Performed by: OBSTETRICS & GYNECOLOGY

## 2024-04-18 PROCEDURE — 85027 COMPLETE CBC AUTOMATED: CPT

## 2024-04-18 PROCEDURE — 36415 COLL VENOUS BLD VENIPUNCTURE: CPT

## 2024-04-18 RX ORDER — IBUPROFEN 800 MG/1
800 TABLET ORAL
Qty: 90 TABLET | Refills: 0 | Status: SHIPPED | OUTPATIENT
Start: 2024-04-18

## 2024-04-18 RX ORDER — OXYCODONE HYDROCHLORIDE AND ACETAMINOPHEN 5; 325 MG/1; MG/1
1 TABLET ORAL EVERY 6 HOURS PRN
Qty: 28 TABLET | Refills: 0 | Status: SHIPPED | OUTPATIENT
Start: 2024-04-18 | End: 2024-04-25

## 2024-04-18 RX ADMIN — DIPHENHYDRAMINE HYDROCHLORIDE 25 MG: 25 CAPSULE ORAL at 06:28

## 2024-04-18 RX ADMIN — DOCUSATE SODIUM 100 MG: 100 CAPSULE, LIQUID FILLED ORAL at 09:21

## 2024-04-18 RX ADMIN — KETOROLAC TROMETHAMINE 30 MG: 30 INJECTION, SOLUTION INTRAMUSCULAR at 09:21

## 2024-04-18 RX ADMIN — ACETAMINOPHEN 1000 MG: 500 TABLET ORAL at 06:06

## 2024-04-18 RX ADMIN — KETOROLAC TROMETHAMINE 30 MG: 30 INJECTION, SOLUTION INTRAMUSCULAR at 02:00

## 2024-04-18 ASSESSMENT — PAIN SCALES - GENERAL
PAINLEVEL_OUTOF10: 0
PAINLEVEL_OUTOF10: 0

## 2024-04-18 NOTE — PROGRESS NOTES
1330: Patient discharge instructions printed and given to patient. Patient verbalizes understanding of discharge summary. Patient instructed to call GYN office for a follow-up appointment. Patient knows when to call 911 or MD in case of an emergency. IV removed. Patient wheeled down safely.    Number Of Deep Sutures (Optional): 1

## 2024-04-18 NOTE — DISCHARGE SUMMARY
Gynecology Discharge Summary     Name: Adeola Coffman MRN: [id number]  SSN: [id number]    YOB: 1985  Age: 38 y.o.  Sex: female      Allergies: Ancef [cefazolin], Cephalosporins, and Penicillins    Admit Date: 4/17/2024    Discharge Date: 4/18/2024      Admitting Physician: Nieves Freitas MD     Discharge Physician: Nieves Freitas MD     * Admission Diagnoses: Fibroid, uterine [D25.9]  Fibroid [D21.9]    * Discharge Diagnoses:      * Procedures: Myomectomy    Consults: None    * Discharge Condition: Stable    * Hospital Course:   Normal     * Discharge Disposition: Home    Discharge Medications:      Medication List        START taking these medications      ibuprofen 800 MG tablet  Commonly known as: ADVIL;MOTRIN  Take 1 tablet by mouth 3 times daily (with meals)     oxyCODONE-acetaminophen 5-325 MG per tablet  Commonly known as: Percocet  Take 1 tablet by mouth every 6 hours as needed for Pain for up to 7 days. Intended supply: 7 days. Take lowest dose possible to manage pain Max Daily Amount: 4 tablets            CONTINUE taking these medications      LORazepam 0.5 MG tablet  Commonly known as: ATIVAN     sertraline 50 MG tablet  Commonly known as: ZOLOFT     Vitamin D3 50 MCG (2000 UT) Caps capsule  Generic drug: vitamin D            STOP taking these medications      GaviLyte-G 236 g solution  Generic drug: polyethylene glycol               Where to Get Your Medications        These medications were sent to [id number] - [place], [place] - [address] - P [phone] - F [phone] [address] [facility] [id number]      Phone: [phone]   ibuprofen 800 MG tablet  oxyCODONE-acetaminophen 5-325 MG per tablet          * Follow-up Care/Patient Instructions:  Activity: No sex, douching, or tampons for 6 weeks or as directed by your physician. No heavy lifting for 6 weeks. No driving while taking pain medication.  Diet: Resume pre-hospital  diet  Wound Care: keep wound clean and dry    Follow-up Information    None      Follow up in 2 weeks

## 2024-04-18 NOTE — PLAN OF CARE
Problem: ABCDS Injury Assessment  Goal: Absence of physical injury  Outcome: Progressing     Problem: Pain  Goal: Verbalizes/displays adequate comfort level or baseline comfort level  Outcome: Progressing  Flowsheets (Taken 4/17/2024 2130)  Verbalizes/displays adequate comfort level or baseline comfort level:   Encourage patient to monitor pain and request assistance   Assess pain using appropriate pain scale     Problem: Safety - Adult  Goal: Free from fall injury  Outcome: Progressing

## 2024-04-24 NOTE — ANESTHESIA POSTPROCEDURE EVALUATION
Department of Anesthesiology  Postprocedure Note    Patient: Adeola Coffman  MRN: [id number]  YOB: 1985  Date of evaluation: 4/24/2024    Procedure Summary       Date: 03/06/24 Room / Location: [facility] MAIN OR 09 / [facility] MAIN OR    Anesthesia Start: 1213 Anesthesia Stop: 1254    Procedure: ABDOMINAL MYOMECTOMY Diagnosis:       Fibroid, uterine      (Fibroid, uterine [D25.9])    Surgeons: Nieves Freitas MD Responsible Provider: Tom Lawson Jr., MD    Anesthesia Type: other ASA Status: 2            Anesthesia Type: other    Stephanie Phase I: Stephanie Score: 10    Stephanie Phase II:      Anesthesia Post Evaluation    Patient location during evaluation: PACU  Patient participation: complete - patient cannot participate  Level of consciousness: awake  Pain score: 0  Airway patency: patent  Nausea & Vomiting: no nausea and no vomiting  Cardiovascular status: hemodynamically stable  Respiratory status: acceptable  Hydration status: stable  Multimodal analgesia pain management approach        No notable events documented.

## 2024-05-08 ENCOUNTER — OFFICE VISIT (OUTPATIENT)
Age: 39
End: 2024-05-08

## 2024-05-08 VITALS
HEIGHT: 62 IN | SYSTOLIC BLOOD PRESSURE: 128 MMHG | WEIGHT: 186 LBS | BODY MASS INDEX: 34.23 KG/M2 | RESPIRATION RATE: 18 BRPM | DIASTOLIC BLOOD PRESSURE: 86 MMHG

## 2024-05-08 DIAGNOSIS — Z48.89 POSTOPERATIVE VISIT: Primary | ICD-10-CM

## 2024-05-08 PROCEDURE — 99024 POSTOP FOLLOW-UP VISIT: CPT | Performed by: OBSTETRICS & GYNECOLOGY

## 2024-05-29 NOTE — PROGRESS NOTES
Adeola Coffman is a 38 y.o. female, No obstetric history on file., Patient's last menstrual period was 05/04/2024 (exact date)., who presents today for the following:  Chief Complaint   Patient presents with   • Post-Op Check        Allergies   Allergen Reactions   • Ancef [Cefazolin] Anaphylaxis     IMMEDIATE REACTION TO IV ANCEF THAT WAS GIVEN PRE-OPERATIVELY ON 3/6/24   • Cephalosporins Anaphylaxis   • Penicillins Anaphylaxis       Current Outpatient Medications   Medication Sig Dispense Refill   • sertraline (ZOLOFT) 50 MG tablet Take 1 tablet by mouth daily     • vitamin D (VITAMIN D3) 50 MCG (2000 UT) CAPS capsule Take 1 capsule by mouth daily     • LORazepam (ATIVAN) 0.5 MG tablet Take 1 tablet by mouth in the morning and at bedtime.     • ibuprofen (ADVIL;MOTRIN) 800 MG tablet Take 1 tablet by mouth 3 times daily (with meals) (Patient not taking: Reported on 5/8/2024) 90 tablet 0     No current facility-administered medications for this visit.         Past Medical History:   Diagnosis Date   • Allergic reaction     Patient had an anaphylactic reaction to Ancef preop on 3/6/24.   • Anemia    • Anxiety    • Depression    • Fallopian tube disorder    • Fibroid    • Miscarriage        Past Surgical History:   Procedure Laterality Date   • MYOMECTOMY N/A 4/17/2024    ABDOMINAL MYOMECTOMY performed by Nieves Freitas MD at [facility] MAIN OR   • ORTHOPEDIC SURGERY Left 2007    knee bakers cyst   • WISDOM TOOTH EXTRACTION      2015 or 2016       Family History   Problem Relation Age of Onset   • Colon Cancer Maternal Grandmother    • Stroke Mother    • Stroke Sister        Social History     Socioeconomic History   • Marital status: Single     Spouse name: Not on file   • Number of children: Not on file   • Years of education: Not on file   • Highest education level: Not on file   Occupational History   • Not on file   Tobacco Use   • Smoking status: Former     Types: Cigarettes     Start date: 2019   •

## (undated) DEVICE — SUTURE MONOCRYL SZ 2-0 L36IN ABSRB UD L36MM CT-1 1/2 CIR Y945H

## (undated) DEVICE — APPLICATOR MEDICATED 26 CC SOLUTION HI LT ORNG CHLORAPREP

## (undated) DEVICE — 3M(TM) MEDIPORE(TM) +PAD SOFT CLOTH ADHESIVE WOUND DRESSING 3570: Brand: 3M™ MEDIPORE™

## (undated) DEVICE — SYRINGE MED 50ML LUERLOCK TIP

## (undated) DEVICE — GARMENT,MEDLINE,DVT,INT,CALF,MED, GEN2: Brand: MEDLINE

## (undated) DEVICE — 1LYRTR 16FR10ML100%SIL UMS SNP: Brand: MEDLINE INDUSTRIES, INC.

## (undated) DEVICE — SUTURE CHROMIC GUT SZ 0 L27IN ABSRB BRN L40MM CT 1/2 CIR 802H

## (undated) DEVICE — SOUTHSIDE TURNOVER: Brand: MEDLINE INDUSTRIES, INC.

## (undated) DEVICE — GLOVE SURG SZ 65 L12IN FNGR THK79MIL GRN LTX FREE

## (undated) DEVICE — SUTURE VCRL + SZ 0 L27IN ABSRB UD L36MM CT-1 1/2 CIR VCPP41D

## (undated) DEVICE — SUTURE MONOCRYL + SZ 4-0 L27IN ABSRB UD L19MM PS-2 3/8 CIR MCP426H

## (undated) DEVICE — BLADE,CARBON-STEEL,10,STRL,DISPOSABLE,TB: Brand: MEDLINE

## (undated) DEVICE — Device: Brand: JELCO

## (undated) DEVICE — DECANTER BAG 9": Brand: MEDLINE INDUSTRIES, INC.

## (undated) DEVICE — VAGINAL PREP TRAY: Brand: MEDLINE INDUSTRIES, INC.

## (undated) DEVICE — THE STERILE LIGHT HANDLE COVER IS USED WITH STERIS SURGICAL LIGHTING AND VISUALIZATION SYSTEMS.

## (undated) DEVICE — SUTURE VICRYL + SZ 2-0 L36IN ABSRB UD L36MM CT-1 1/2 CIR VCP945H

## (undated) DEVICE — SPONGE GZ W4XL4IN COT 12 PLY TYP VII WVN C FLD DSGN STERILE

## (undated) DEVICE — AGENT HEMSTAT 3GM OXIDIZED REGENERATED CELOS ABSRB FOR CONT (ORDER MULTIPLES OF 5EA)

## (undated) DEVICE — SUTURE VCRL + SZ 2-0 L36IN ABSRB UD L36MM CT-1 1/2 CIR VCP945H

## (undated) DEVICE — MAJOR LAP PROCEDURE PACK: Brand: MEDLINE INDUSTRIES, INC.

## (undated) DEVICE — SUTURE VCRL + SZ 0 L27IN ABSRB UD CT-1 L36MM 1/2 CIR TAPR VCP260H

## (undated) DEVICE — NEEDLE SPNL 20GA L3.5IN YEL HUB S STL REG WALL FIT STYL

## (undated) DEVICE — SOLUTION IV 50ML 0.9% SOD CHL PLAS CONT USP VIAFLX

## (undated) DEVICE — DRAPE,T,LAPARO,TRANS,STERILE: Brand: MEDLINE

## (undated) DEVICE — GLOVE SURG SZ 65 THK91MIL LTX FREE SYN POLYISOPRENE

## (undated) DEVICE — HYPODERMIC SAFETY NEEDLE: Brand: MONOJECT

## (undated) DEVICE — SUTURE VICRYL + SZ 0 L27IN ABSRB UD CT-1 L36MM 1/2 CIR TAPR VCP260H

## (undated) DEVICE — BLADE ES L6IN ELASTOMERIC COAT EXT DURABLE BEND UPTO 90DEG

## (undated) DEVICE — BARRIER ADH W3XL4IN UTER PELV ABSRB GYNECARE INTCEED

## (undated) DEVICE — ELECTRODE PT RET AD L9FT HI MOIST COND ADH HYDRGEL CORDED

## (undated) DEVICE — SYRINGE MED 10ML LUERLOCK TIP W/O SFTY DISP